# Patient Record
Sex: MALE | Race: WHITE | NOT HISPANIC OR LATINO | Employment: STUDENT | URBAN - METROPOLITAN AREA
[De-identification: names, ages, dates, MRNs, and addresses within clinical notes are randomized per-mention and may not be internally consistent; named-entity substitution may affect disease eponyms.]

---

## 2017-01-03 ENCOUNTER — CLINICAL SUPPORT (OUTPATIENT)
Dept: REHABILITATION | Facility: HOSPITAL | Age: 27
End: 2017-01-03
Attending: ORTHOPAEDIC SURGERY
Payer: COMMERCIAL

## 2017-01-03 DIAGNOSIS — M25.561 ACUTE PAIN OF RIGHT KNEE: ICD-10-CM

## 2017-01-03 PROCEDURE — 97110 THERAPEUTIC EXERCISES: CPT

## 2017-01-03 NOTE — PROGRESS NOTES
Physical Therapy Daily Note     Date: 01/03/2017  Name: [place] [facility] Number: [id number]  Diagnosis:   Encounter Diagnosis   Name Primary?    Acute pain of right knee      Physician: Margarito Machuca MD    Evaluation Date: 12/20/16  Date of Surgery: 12/19/16  Visit #: 3   Start Time:  1115  Stop Time:  1200  Total Treatment Time: 45    Precautions: s/p ACL with meniscus protocol        Subjective     Pt reports he has been doing his HEP while on vacation.    Pain: 1/10    Objective     Measurements taken:     Range of motion (PROM)     Knee:  Left Right   Extension  0 -2   Flexion 145 65       Patient received individual therapy to increase strength, endurance, ROM, flexibility and core stabilization with activities as follows:     [place] received therapeutic exercises to develop strength, endurance, ROM, flexibility and core stabilization for 40 minutes including:   Quad sets x30  Quad sets with ball squeeze x30-np  Ankle pumps x30  Heel prop x3 min  HS sets x30 bolster under knee-np  Seated AAROM R knee flex 10 sec x10  4 way hip x15  Calf stretch x1 min  Patellar mobs gentle x10 min    Treatment ended with ice x10 min    Written Home Exercises Provided: provided at initial eval  Pt demo good understanding of the education provided. [place] demonstrated good return demonstration of activities.     Education provided:  [place] verbalized good understanding of education provided.   No spiritual or educational barriers to learning identified.    Assessment   Patient demonstrated improved quad set and R knee ROM this visit. He remains slightly limited with knee extension, but presents with inflammation which could be a limiting factor. He is progressing well with ACL with meniscus repair protocol and would continue to benefit from PT intervention to reach functional goals.    This is a 26 y.o. male referred to outpatient physical  therapy and presents with a medical diagnosis of s/p R ACL with medial and lateral meniscus repairs and demonstrates limitations as described in the problem list Pt prognosis is Good. Pt will continue to benefit from skilled outpatient physical therapy to address the deficits listed below in the problem list, provide pt/family education and to maximize pt's level of independence in the home and community environment.    Will the patient continue to benefit from skilled PT intervention? yes        Medical necessity is demonstrated by:   - Pain limits function of effected part for all activities  - Unable to participate in daily activities       Progress towards goals: good    New/Revised Goals:none this visit       Plan   Continue with established Plan of Care towards PT goals.      Therapist: Trixie Amos, PT, DPT

## 2017-01-04 ENCOUNTER — OFFICE VISIT (OUTPATIENT)
Dept: SPORTS MEDICINE | Facility: CLINIC | Age: 27
End: 2017-01-04
Payer: COMMERCIAL

## 2017-01-04 ENCOUNTER — HOSPITAL ENCOUNTER (OUTPATIENT)
Dept: RADIOLOGY | Facility: HOSPITAL | Age: 27
Discharge: HOME | End: 2017-01-04
Attending: ORTHOPAEDIC SURGERY
Payer: COMMERCIAL

## 2017-01-04 VITALS
WEIGHT: 190 LBS | HEART RATE: 120 BPM | BODY MASS INDEX: 24.38 KG/M2 | SYSTOLIC BLOOD PRESSURE: 162 MMHG | DIASTOLIC BLOOD PRESSURE: 95 MMHG | HEIGHT: 74 IN

## 2017-01-04 DIAGNOSIS — M25.561 RIGHT KNEE PAIN, UNSPECIFIED CHRONICITY: ICD-10-CM

## 2017-01-04 DIAGNOSIS — M25.561 RIGHT KNEE PAIN, UNSPECIFIED CHRONICITY: Primary | ICD-10-CM

## 2017-01-04 PROCEDURE — 99024 POSTOP FOLLOW-UP VISIT: CPT | Mod: S$GLB,,, | Performed by: PHYSICIAN ASSISTANT

## 2017-01-04 PROCEDURE — 73560 X-RAY EXAM OF KNEE 1 OR 2: CPT | Mod: 26,RT,, | Performed by: RADIOLOGY

## 2017-01-04 PROCEDURE — 99999 PR PBB SHADOW E&M-EST. PATIENT-LVL III: CPT | Mod: PBBFAC,,, | Performed by: PHYSICIAN ASSISTANT

## 2017-01-04 PROCEDURE — 73560 X-RAY EXAM OF KNEE 1 OR 2: CPT | Mod: TC,PO,RT

## 2017-01-04 NOTE — PROGRESS NOTES
"CC: Right knee ACL reconstruction with BTB autograft post op 2 weeks  Patient presents to clinic for 2 week post op evaluation of right knee. Pain today is 0/10. Denies nausea, vomiting, fever, chills. He is going to PT with Trixie Amos. He is ambulating toe touch to 25% weight bearing with T-scope brace on.     DATE OF PROCEDURE: 12/19/2016      PREOPERATIVE DIAGNOSES:   1. Right knee anterior cruciate ligament tear.   2. Right knee medial meniscus tear  3. Right knee lateral meniscus tear  4. Right knee medial femoral condyle cartilage injury     POSTOPERATIVE DIAGNOSES:   1. Right knee anterior cruciate ligament tear.   2. Right knee medial meniscus tear  3. Right knee lateral meniscus tear  4. Right knee medial femoral condyle cartilage injury     PROCEDURES:   1. Right knee anterior cruciate ligament reconstruction with ipsilateral bone-patellar tendon-bone autograft.   2. Right knee arthroscopic medial and lateral meniscus repairs  3. Right knee arthroscopic chondroplasty / debridement medial femoral condyle     SURGEON: Margarito Machuca M.D.      ASSISTANTS:   1. Han Lopez MD  2. Anahy Marshall    PE:    Visit Vitals    BP (!) 162/95    Pulse (!) 120    Ht 6' 2" (1.88 m)    Wt 86.2 kg (190 lb)    BMI 24.39 kg/m2        Right knee:    Anterior Incision clean/dry/intact  Dermabond Prineo in place over incision.  No sign of infection  Mild swelling  Compartments soft  Neurovascular status intact in extremity    ROM 0-90  Decreased quad strength  Minimal to no effusion    Assessment:  2 weeks s/p right ACL reconstruction with BTB autograft, recovering appropriately    Plan:  1.  Removed steri strips.  Wounds healing well    2.  Arthroscopic pictures reviewed and rehab plans discussed.    3.  Physical therapy for ACL with BTB protocol.  Emphasize extension and straight leg raises.    4.  Pain level acceptable for first post op visit.  Wean off pain medicine by next visit if still taking.    5. Return to " clinic in 4 weeks at 6 weeks post-op.

## 2017-01-04 NOTE — MR AVS SNAPSHOT
[facility]  [address]  [facility] [address]  Phone: [phone]                  [place]   2017 3:45 PM   Appointment    Description:  Male : 1990   Provider:  Nataliia Green PA-C   Department:  [facility]                To Do List           Future Appointments        Provider Department Dept Phone    2017 3:45 PM Nataliia Green PA-C [facility] [phone]    2017 11:00 AM Trixie Amos PT [facility] [phone]    2017 11:00 AM Margarito Machuca MD [facility] [phone]      Goals (5 Years of Data)     None      [facility]     [facility] Care Line -  Assistance  Registered nurses in the [facility] provide clinical advisement, health education, appointment booking, and other advisory services.  Call for this free service at [phone].             Medications           Message regarding Medications     Verify the changes and/or additions to your medication regime listed below are the same as discussed with your clinician today.  If any of these changes or additions are incorrect, please notify your healthcare provider.             Verify that the below list of medications is an accurate representation of the medications you are currently taking.  If none reported, the list may be blank. If incorrect, please contact your healthcare provider. Carry this list with you in case of emergency.           Current Medications     aspirin (ECOTRIN) 325 MG EC tablet Take 1 tablet (325 mg total) by mouth once daily. Starting the day after your surgery    oxycodone-acetaminophen (PERCOCET)  mg per tablet Take 1 tablet by mouth every 4 to 6 hours as needed for Pain.    promethazine (PHENERGAN) 25 MG tablet Take 1 tablet (25 mg total) by mouth every 6 (six) hours as needed for Nausea.    tramadol (ULTRAM) 50 mg tablet Take 1 tablet (50  mg total) by mouth every 4 to 6 hours as needed for Pain.           Clinical Reference Information           Allergies as of 1/4/2017     No Known Allergies      Immunizations Administered on Date of Encounter - 1/4/2017     None

## 2017-01-05 ENCOUNTER — CLINICAL SUPPORT (OUTPATIENT)
Dept: REHABILITATION | Facility: HOSPITAL | Age: 27
End: 2017-01-05
Attending: ORTHOPAEDIC SURGERY
Payer: COMMERCIAL

## 2017-01-05 DIAGNOSIS — M25.561 ACUTE PAIN OF RIGHT KNEE: ICD-10-CM

## 2017-01-05 PROCEDURE — 97140 MANUAL THERAPY 1/> REGIONS: CPT

## 2017-01-05 PROCEDURE — 97110 THERAPEUTIC EXERCISES: CPT

## 2017-01-05 NOTE — PROGRESS NOTES
Physical Therapy Daily Note     Date: 01/05/2017  Name: [place] Lazar [facility] Number: [id number]  Diagnosis:   Encounter Diagnosis   Name Primary?    Acute pain of right knee      Physician: Margarito Machuca MD    Evaluation Date: 12/20/16  Date of Surgery: 12/19/16  Visit #: 3   Start Time:  1115  Stop Time:  1205  Total Treatment Time: 50    Precautions: s/p ACL with meniscus protocol        Subjective     Pt reports he doing good today.  Pain: 1/10    Objective     Measurements taken:     Range of motion (PROM)     Knee:  Left Right   Extension  0 -2   Flexion 145 75       Patient received individual therapy to increase strength, endurance, ROM, flexibility and core stabilization with activities as follows:     [place] received therapeutic exercises to develop strength, endurance, ROM, flexibility and core stabilization for 40 minutes including:   Quad sets x30 with 1/2 foam under knee  Quad sets with ball squeeze x30  Heel prop x3 min (big bolster)  HS sets x30 bolster under knee  Seated AAROM R knee flex 10 sec x10  4 way hip x15 1#  Calf stretch x1 min  HSS x1 min   Patellar mobs gentle x10 min    Treatment ended with ice x10 min    Written Home Exercises Provided: provided at initial eval  Pt demo good understanding of the education provided. [place] demonstrated good return demonstration of activities.     Education provided:  [place] verbalized good understanding of education provided.   No spiritual or educational barriers to learning identified.    Assessment   Patient tolerated resistance with leg raises well and continued to show good quad control. He is progressing well with ACL with meniscus repair protocol and would continue to benefit from PT intervention to reach functional goals.    This is a 26 y.o. male referred to outpatient physical therapy and presents with a medical diagnosis of s/p R ACL with medial and lateral  meniscus repairs and demonstrates limitations as described in the problem list Pt prognosis is Good. Pt will continue to benefit from skilled outpatient physical therapy to address the deficits listed below in the problem list, provide pt/family education and to maximize pt's level of independence in the home and community environment.    Will the patient continue to benefit from skilled PT intervention? yes        Medical necessity is demonstrated by:   - Pain limits function of effected part for all activities  - Unable to participate in daily activities       Progress towards goals: good    New/Revised Goals:none this visit       Plan   Continue with established Plan of Care towards PT goals.      Therapist: Trixie Amos, PT, DPT

## 2017-01-09 ENCOUNTER — CLINICAL SUPPORT (OUTPATIENT)
Dept: REHABILITATION | Facility: HOSPITAL | Age: 27
End: 2017-01-09
Attending: ORTHOPAEDIC SURGERY
Payer: COMMERCIAL

## 2017-01-09 ENCOUNTER — PATIENT MESSAGE (OUTPATIENT)
Dept: SPORTS MEDICINE | Facility: CLINIC | Age: 27
End: 2017-01-09

## 2017-01-09 DIAGNOSIS — M25.561 ACUTE PAIN OF RIGHT KNEE: ICD-10-CM

## 2017-01-09 DIAGNOSIS — S83.511A COMPLETE TEAR OF RIGHT ACL, INITIAL ENCOUNTER: Primary | ICD-10-CM

## 2017-01-09 PROCEDURE — 97140 MANUAL THERAPY 1/> REGIONS: CPT

## 2017-01-09 PROCEDURE — 97110 THERAPEUTIC EXERCISES: CPT

## 2017-01-10 NOTE — PROGRESS NOTES
Physical Therapy Daily Note     Diagnosis:   Encounter Diagnoses   Name Primary?    Acute pain of right knee     Complete tear of right ACL, initial encounter Yes     Physician: Margarito Machuca MD    Evaluation Date: 12/20/16  Date of Surgery: 12/19/16  Visit #: 5  Total Treatment Time: 50  Precautions: s/p ACL with meniscus protocol    Subjective     Pt reports doing well without pain today. Pt states having mild pain on lateral aspect of knee with flexion     Objective     Measurements taken:     Range of motion (PROM)     Knee:  Left Right   Extension  0 -2   Flexion 145 90       Patient received individual therapy to increase strength, endurance, ROM, flexibility and core stabilization with activities as follows:     [place] received therapeutic exercises to develop strength, endurance, ROM, flexibility and core stabilization for 40 minutes including:   Heel slides 30 reps - less than 90 degrees AAROM   Quad sets x30 with 1/2 foam under knee  Quad sets with ball squeeze x30  Heel prop x3 min 1/2 foam 2# - 2 minutes   HS sets x30 bolster under knee  Seated AAROM R knee flex 10 sec x10  3 way hip 2 x 10 reps   Calf stretch x1 min  HSS x1 min   Patellar mobs gentle x10 min    Treatment ended with ice x10 min    Written Home Exercises Provided: provided at initial eval  Pt demo good understanding of the education provided. [place] demonstrated good return demonstration of activities.     Education provided:  [place] verbalized good understanding of education provided.   No spiritual or educational barriers to learning identified.    Assessment   Patient with good tolerance to PT today; no increase of pain during session but quad muscle fatigue at the end of session. He is progressing well with ACL with meniscus repair protocol and would continue to benefit from PT intervention to reach functional goals.    This is a 26 y.o. male referred to  outpatient physical therapy and presents with a medical diagnosis of s/p R ACL with medial and lateral meniscus repairs and demonstrates limitations as described in the problem list Pt prognosis is Good. Pt will continue to benefit from skilled outpatient physical therapy to address the deficits listed below in the problem list, provide pt/family education and to maximize pt's level of independence in the home and community environment.    Will the patient continue to benefit from skilled PT intervention? yes        Medical necessity is demonstrated by:   - Pain limits function of effected part for all activities  - Unable to participate in daily activities       Progress towards goals: good    New/Revised Goals:none this visit       Plan   Continue with established Plan of Care towards PT goals. PT/PTA face-to-face:discussed Pt's POC and progress towards established PT goals.  Susanne Narayan, PTA

## 2017-01-11 ENCOUNTER — CLINICAL SUPPORT (OUTPATIENT)
Dept: REHABILITATION | Facility: HOSPITAL | Age: 27
End: 2017-01-11
Attending: ORTHOPAEDIC SURGERY
Payer: COMMERCIAL

## 2017-01-11 DIAGNOSIS — M25.561 ACUTE PAIN OF RIGHT KNEE: ICD-10-CM

## 2017-01-11 PROCEDURE — 97032 APPL MODALITY 1+ESTIM EA 15: CPT

## 2017-01-11 PROCEDURE — 97140 MANUAL THERAPY 1/> REGIONS: CPT

## 2017-01-11 PROCEDURE — 97110 THERAPEUTIC EXERCISES: CPT

## 2017-01-11 NOTE — PROGRESS NOTES
Physical Therapy Daily Note     Diagnosis:   Encounter Diagnosis   Name Primary?    Acute pain of right knee      Physician: Margarito Machuca MD    Evaluation Date: 12/20/16  Date of Surgery: 12/19/16  Visit #: 6  Total Treatment Time: 65  Precautions: s/p ACL with meniscus protocol    Subjective     Pt reports doing well without pain today.    Objective     Measurements taken:     Range of motion (PROM)     Knee:  Left Right   Extension  0 -2   Flexion 145 90       Patient received individual therapy to increase strength, endurance, ROM, flexibility and core stabilization with activities as follows:     [place] received therapeutic exercises to develop strength, endurance, ROM, flexibility and core stabilization for 60 minutes including:   Heel slides 30 reps - less than 90 degrees AAROM -np  Quad sets with russian e-stim (10 sec on/10 sec off) and strap x10 min  Quad sets with ball squeeze x30-np  Heel prop x3 min 1/2 foam 2# - 2 minutes -np  Prone hang 2# x3 min  HS sets x30 bolster under knee-np  Seated AAROM R knee ext  x10  4 way hip 1# x20  Heel raises x20 (25% WB on R)  Mini squats x20 (25% WB on R)  Calf stretch x1 min  HSS x1 min -np  Patellar mobs with STM x10 min    Treatment ended with ice x10 min    Written Home Exercises Provided: provided at initial eval  Pt demo good understanding of the education provided. [place] demonstrated good return demonstration of activities.     Education provided:  [place] verbalized good understanding of education provided.   No spiritual or educational barriers to learning identified.    Assessment   Patient demonstrated improved quad strength and knee extension after Lao e-stim. He is progressing well with ACL with meniscus repair protocol and would continue to benefit from PT intervention to reach functional goals.    This is a 26 y.o. male referred to outpatient physical therapy and  presents with a medical diagnosis of s/p R ACL with medial and lateral meniscus repairs and demonstrates limitations as described in the problem list Pt prognosis is Good. Pt will continue to benefit from skilled outpatient physical therapy to address the deficits listed below in the problem list, provide pt/family education and to maximize pt's level of independence in the home and community environment.    Will the patient continue to benefit from skilled PT intervention? yes        Medical necessity is demonstrated by:   - Pain limits function of effected part for all activities  - Unable to participate in daily activities       Progress towards goals: good    New/Revised Goals:none this visit       Plan   Continue with established Plan of Care towards PT goals.     Trixie Amos, PT, DPT

## 2017-01-17 ENCOUNTER — CLINICAL SUPPORT (OUTPATIENT)
Dept: REHABILITATION | Facility: HOSPITAL | Age: 27
End: 2017-01-17
Attending: ORTHOPAEDIC SURGERY
Payer: COMMERCIAL

## 2017-01-17 DIAGNOSIS — M25.561 ACUTE PAIN OF RIGHT KNEE: Primary | ICD-10-CM

## 2017-01-17 PROCEDURE — 97110 THERAPEUTIC EXERCISES: CPT

## 2017-01-17 PROCEDURE — 97140 MANUAL THERAPY 1/> REGIONS: CPT

## 2017-01-17 PROCEDURE — 97014 ELECTRIC STIMULATION THERAPY: CPT

## 2017-01-17 NOTE — PROGRESS NOTES
Physical Therapy Daily Note     Diagnosis:   Encounter Diagnosis   Name Primary?    Acute pain of right knee Yes     Physician: Margarito Machuca MD    Evaluation Date: 12/20/16  Date of Surgery: 12/19/16  Visit #: 7  Total Treatment Time: 65  Precautions: s/p ACL with meniscus protocol    Subjective     Pt reports doing well and again is without pain today.    Objective     Measurements taken:     Range of motion (PROM)     Knee:  Left Right   Extension  0 -2   Flexion 145 90       Pt was instructed in and performed ther ex  to increase strength, endurance, ROM, flexibility and core stabilization with activities as follows:     [place] was instructed in and performed therapeutic exercises to develop strength, endurance, ROM, flexibility and core stabilization for 40 minutes including:   Stat bike  Heel slides 30 reps - less than 120 degrees AAROM -  Quad sets with russian e-stim (10 sec on/10 sec off) and strap x10 min  Quad sets with ball squeeze x30-np  Heel prop x3 min 1/2 foam 2# - 2 minutes -np  Prone hang 3# x4 min  HS sets x30 bolster under knee-np  Seated AAROM R knee ext  x10 NP  4 way hip 1.5# x30  Heel raises x20 (25% WB on R) NP  Mini squats x20 (25% WB on R) NP  Calf stretch x2 min  HSS x 2min -  Patellar mobs with STM x10 min    Treatment ended with ice x10 min    Written Home Exercises Provided: provided at initial eval  Pt demo good understanding of the education provided. [place] demonstrated good return demonstration of activities.     Education provided:  [place] verbalized good understanding of education provided.   No spiritual or educational barriers to learning identified.    Assessment   Patient demonstrated improved quad strength and knee extension after [place]ian e-stim. He is progressing well with ACL with meniscus repair protocol and would continue to benefit from PT intervention to reach functional goals.    This is  a 26 y.o. male referred to outpatient physical therapy and presents with a medical diagnosis of s/p R ACL with medial and lateral meniscus repairs and demonstrates limitations as described in the problem list Pt prognosis is Good. Pt will continue to benefit from skilled outpatient physical therapy to address the deficits listed below in the problem list, provide pt/family education and to maximize pt's level of independence in the home and community environment.    Will the patient continue to benefit from skilled PT intervention? yes        Medical necessity is demonstrated by:   - Pain limits function of effected part for all activities  - Unable to participate in daily activities       Progress towards goals: good    New/Revised Goals:none this visit       Plan   Continue with established Plan of Care towards PT goals.     Sohail Kilgore PTA,

## 2017-01-19 ENCOUNTER — CLINICAL SUPPORT (OUTPATIENT)
Dept: REHABILITATION | Facility: HOSPITAL | Age: 27
End: 2017-01-19
Attending: ORTHOPAEDIC SURGERY
Payer: COMMERCIAL

## 2017-01-19 DIAGNOSIS — M25.561 ACUTE PAIN OF RIGHT KNEE: ICD-10-CM

## 2017-01-19 PROCEDURE — 97032 APPL MODALITY 1+ESTIM EA 15: CPT

## 2017-01-19 PROCEDURE — 97110 THERAPEUTIC EXERCISES: CPT

## 2017-01-24 ENCOUNTER — CLINICAL SUPPORT (OUTPATIENT)
Dept: REHABILITATION | Facility: HOSPITAL | Age: 27
End: 2017-01-24
Attending: ORTHOPAEDIC SURGERY
Payer: COMMERCIAL

## 2017-01-24 DIAGNOSIS — M25.561 ACUTE PAIN OF RIGHT KNEE: Primary | ICD-10-CM

## 2017-01-24 PROCEDURE — 97110 THERAPEUTIC EXERCISES: CPT

## 2017-01-24 PROCEDURE — 97140 MANUAL THERAPY 1/> REGIONS: CPT

## 2017-01-24 NOTE — PROGRESS NOTES
Physical Therapy Daily Note     Diagnosis:   Encounter Diagnosis   Name Primary?    Acute pain of right knee Yes     Physician: Margarito Machuca MD    Evaluation Date: 12/20/16  Date of Surgery: 12/19/16  Visit #: 9  Total Treatment Time: 65  Precautions: s/p ACL with meniscus protocol    Subjective     Pt reports doing well and again is without pain today.    Objective     Measurements taken:   Decreased edema, healing incision   Range of motion (PROM)     Knee:  Left Right   Extension  0 -2   Flexion 145 110       Pt was instructed in and performed ther ex  to increase strength, endurance, ROM, flexibility and core stabilization with activities as follows:     [place] was instructed in and performed therapeutic exercises to develop strength, endurance, ROM, flexibility and core stabilization for 55 minutes including:   Stat bike x 10 min  Heel slides 30 reps - less than 120 degrees AAROM -  Quad sets with russian e-stim (10 sec on/10 sec off) and strap x10 min  Heel prop x3 min 1/2 foam 2# - 2 minutes -np  Prone hang 3# x4 min-  HS sets x30 bolster under knee-np  Seated AAROM R knee ext  x10 NP  4 way hip 1.5# x30  Heel raises x20 (50% WB on R)   Mini squats x20 (50% WB on R)   Calf stretch x2 min  HSS x 2min -np  Patellar mobs with STM x10 min    Treatment ended with ice x10 min    Written Home Exercises Provided: provided at initial eval  Pt demo good understanding of the education provided. [place] demonstrated good return demonstration of activities.     Education provided:  [place] verbalized good understanding of education provided.   No spiritual or educational barriers to learning identified.    Assessment   Patient continues to progress ROM appropriately according to ACL with meniscus protocol. He is progressing well with ACL with meniscus repair protocol and would continue to benefit from PT intervention to reach functional  goals.    This is a 26 y.o. male referred to outpatient physical therapy and presents with a medical diagnosis of s/p R ACL with medial and lateral meniscus repairs and demonstrates limitations as described in the problem list Pt prognosis is Good. Pt will continue to benefit from skilled outpatient physical therapy to address the deficits listed below in the problem list, provide pt/family education and to maximize pt's level of independence in the home and community environment.    Will the patient continue to benefit from skilled PT intervention? yes        Medical necessity is demonstrated by:   - Pain limits function of effected part for all activities  - Unable to participate in daily activities       Progress towards goals: good    New/Revised Goals:none this visit       Plan   Continue with established Plan of Care towards PT goals.     Sohail Kilgore PTA,

## 2017-01-26 ENCOUNTER — CLINICAL SUPPORT (OUTPATIENT)
Dept: REHABILITATION | Facility: HOSPITAL | Age: 27
End: 2017-01-26
Attending: ORTHOPAEDIC SURGERY
Payer: COMMERCIAL

## 2017-01-26 DIAGNOSIS — M25.561 ACUTE PAIN OF RIGHT KNEE: ICD-10-CM

## 2017-01-26 PROCEDURE — 97014 ELECTRIC STIMULATION THERAPY: CPT

## 2017-01-26 PROCEDURE — 97110 THERAPEUTIC EXERCISES: CPT

## 2017-01-26 NOTE — PROGRESS NOTES
Physical Therapy Daily Note     Diagnosis:   Encounter Diagnosis   Name Primary?    Acute pain of right knee      Physician: Margarito Machuca MD    Evaluation Date: 12/20/16  Date of Surgery: 12/19/16  Visit #: 9  Total Treatment Time: 65  Precautions: s/p ACL with meniscus protocol    Subjective     Pt reports doing well and again is without pain today.    Objective     Measurements taken:   Decreased edema, healing incision   Range of motion (PROM)     Knee:  Left Right   Extension  0 -2   Flexion 145 110       Pt was instructed in and performed ther ex  to increase strength, endurance, ROM, flexibility and core stabilization with activities as follows:     [place] was instructed in and performed therapeutic exercises to develop strength, endurance, ROM, flexibility and core stabilization for 60 minutes including:   Stat bike x 10 min  Heel slides 30 reps - less than 120 degrees AAROM -np  Quad sets with russian e-stim (10 sec on/10 sec off) and strap x10 min  Heel prop x3 min 1/2 foam 2# - 2 minutes -np  Prone hang 3# x4 min-np  HS sets x30 bolster under knee-np  Seated AAROM R knee ext  x10 NP  4 way hip 1.5# x30-np  Heel raises x20 (50% WB on R)   Mini squats x20 (50% WB on R)   Clamshells x30 btb   TKE 10# x30  Calf stretch x2 min  HSS x 2min   Patellar mobs with STM x5 min  Supine hip flexor stretch x2 min    Treatment ended with ice x10 min    Written Home Exercises Provided: provided at initial eval  Pt demo good understanding of the education provided. [place] demonstrated good return demonstration of activities.     Education provided:  [place] verbalized good understanding of education provided.   No spiritual or educational barriers to learning identified.    Assessment   Patient tolerated new exercises well and demonstrated good technique with tactile cues. He is progressing well with ACL with meniscus repair protocol and would  continue to benefit from PT intervention to reach functional goals.    This is a 26 y.o. male referred to outpatient physical therapy and presents with a medical diagnosis of s/p R ACL with medial and lateral meniscus repairs and demonstrates limitations as described in the problem list Pt prognosis is Good. Pt will continue to benefit from skilled outpatient physical therapy to address the deficits listed below in the problem list, provide pt/family education and to maximize pt's level of independence in the home and community environment.    Will the patient continue to benefit from skilled PT intervention? yes        Medical necessity is demonstrated by:   - Pain limits function of effected part for all activities  - Unable to participate in daily activities       Progress towards goals: good    New/Revised Goals:none this visit       Plan   Continue with established Plan of Care towards PT goals.     Trixie Amos, PT, DPT

## 2017-01-31 ENCOUNTER — CLINICAL SUPPORT (OUTPATIENT)
Dept: REHABILITATION | Facility: HOSPITAL | Age: 27
End: 2017-01-31
Attending: ORTHOPAEDIC SURGERY
Payer: COMMERCIAL

## 2017-01-31 ENCOUNTER — OFFICE VISIT (OUTPATIENT)
Dept: SPORTS MEDICINE | Facility: CLINIC | Age: 27
End: 2017-01-31
Payer: COMMERCIAL

## 2017-01-31 VITALS
BODY MASS INDEX: 24.38 KG/M2 | HEART RATE: 88 BPM | DIASTOLIC BLOOD PRESSURE: 92 MMHG | WEIGHT: 190 LBS | HEIGHT: 74 IN | SYSTOLIC BLOOD PRESSURE: 145 MMHG

## 2017-01-31 DIAGNOSIS — Z98.890 S/P ACL RECONSTRUCTION: Primary | ICD-10-CM

## 2017-01-31 DIAGNOSIS — M25.561 ACUTE PAIN OF RIGHT KNEE: Primary | ICD-10-CM

## 2017-01-31 DIAGNOSIS — Z98.890 POST-OPERATIVE STATE: ICD-10-CM

## 2017-01-31 PROCEDURE — 97110 THERAPEUTIC EXERCISES: CPT

## 2017-01-31 PROCEDURE — 99999 PR PBB SHADOW E&M-EST. PATIENT-LVL III: CPT | Mod: PBBFAC,,, | Performed by: ORTHOPAEDIC SURGERY

## 2017-01-31 PROCEDURE — 97140 MANUAL THERAPY 1/> REGIONS: CPT

## 2017-01-31 PROCEDURE — 99024 POSTOP FOLLOW-UP VISIT: CPT | Mod: S$GLB,,, | Performed by: ORTHOPAEDIC SURGERY

## 2017-01-31 NOTE — PROGRESS NOTES
"CC: Right knee scope post op 6 weeks.  Physical therapy 2 x week at [facility] location.  DATE OF PROCEDURE: 12/19/2016      PREOPERATIVE DIAGNOSES:   1. Right knee anterior cruciate ligament tear.   2. Right knee medial meniscus tear  3. Right knee lateral meniscus tear  4. Right knee medial femoral condyle cartilage injury     POSTOPERATIVE DIAGNOSES:   1. Right knee anterior cruciate ligament tear.   2. Right knee medial meniscus tear  3. Right knee lateral meniscus tear  4. Right knee medial femoral condyle cartilage injury     PROCEDURES:   1. Right knee anterior cruciate ligament reconstruction with ipsilateral bone-patellar tendon-bone autograft.   2. Right knee arthroscopic medial and lateral meniscus repairs  3. Right knee arthroscopic chondroplasty / debridement medial femoral condyle     SURGEON: Margarito Machuca M.D.    PE:    Visit Vitals    BP (!) 145/92    Pulse 88    Ht 6' 2" (1.88 m)    Wt 86.2 kg (190 lb)    BMI 24.39 kg/m2        Right knee:    Incision clean/dry/intact  No sign of infection  Mild swelling  Compartments soft  Neurovascular status intact in extremity    FROM  Good quad strength  No effusion  No tenderness over medial or lateral joint lines    ROM: 0-135    Assessment:  6 weeks s/p right knee ACL     Plan:  1. Continue physical therapy  2 Transition out of brace and off crutches  3. Follow up 6 weeks      "

## 2017-01-31 NOTE — PROGRESS NOTES
Physical Therapy Daily Note     Diagnosis:   Encounter Diagnosis   Name Primary?    Acute pain of right knee Yes     Physician: Margarito Machuca MD    Evaluation Date: 12/20/16  Date of Surgery: 12/19/16  Visit #: 9  Total Treatment Time: 65  Precautions: s/p ACL with meniscus protocol    Subjective     Pt reports doing well and again is without pain today.    Objective     Measurements taken: PROM flex 125 degrees prone, +2 ext in supine.  Decreased edema, healing incision   Range of motion (PROM)     Knee:  Left Right   Extension  0 -2   Flexion 145 110       Pt was instructed in and performed ther ex  to increase strength, endurance, ROM, flexibility and core stabilization with activities as follows:     [place] was instructed in and performed therapeutic exercises to develop strength, endurance, ROM, flexibility and core stabilization for 60 minutes including:   Stat bike x 10 min  Quad sets with russian e-stim (10 sec on/10 sec off) and strap x10 min  Prone hang 4# x4 min-  prone flex stretch with strap 2 min  4 way hip 2.5# x30-  Leg Press 60# 30x  Calf Press 60# 30x  Wall Squat 3 x fatigue   Clamshells x30 btb   TKE 10# x30  Calf stretch x2 min  standing flex stretch on stairs 2 min  HSS x 2min   Patellar jt mobs PROM x 10 min  Supine hip flexor stretch x2 min    Treatment ended with ice x10 min NP    Written Home Exercises Provided: provided at initial eval  Pt demo good understanding of the education provided. [place] demonstrated good return demonstration of activities.     Education provided:  [place] verbalized good understanding of education provided.   No spiritual or educational barriers to learning identified.    Assessment   Patient tolerated new exercises well and demonstrated good technique with tactile cues. He is progressing well with ACL with meniscus repair protocol and would continue to benefit from PT intervention to  reach functional goals.Pt sitll demonstrates quad weakness, improving ROM.  This is a 26 y.o. male referred to outpatient physical therapy and presents with a medical diagnosis of s/p R ACL with medial and lateral meniscus repairs and demonstrates limitations as described in the problem list Pt prognosis is Good. Pt will continue to benefit from skilled outpatient physical therapy to address the deficits listed below in the problem list, provide pt/family education and to maximize pt's level of independence in the home and community environment.    Will the patient continue to benefit from skilled PT intervention? yes        Medical necessity is demonstrated by:   - Pain limits function of effected part for all activities  - Unable to participate in daily activities       Progress towards goals: good    New/Revised Goals:none this visit       Plan   Continue with established Plan of Care towards PT goals.     Sohail Kilgore PTA,

## 2017-01-31 NOTE — MR AVS SNAPSHOT
[facility]  [address]  [facility] [address]  Phone: [phone]                  [place]   2017 11:00 AM   Office Visit    Description:  Male : 1990   Provider:  Margarito Machuca MD   Department:  [facility]           Reason for Visit     Right Knee - Post-op Evaluation                To Do List           Future Appointments        Provider Department Dept Phone    2017 11:00 AM Trixie Amos PT [facility] [phone]      Goals (5 Years of Data)     None      [facility]     [facility] -  Assistance  Registered nurses in the [facility] provide clinical advisement, health education, appointment booking, and other advisory services.  Call for this free service at [phone].             Medications           Message regarding Medications     Verify the changes and/or additions to your medication regime listed below are the same as discussed with your clinician today.  If any of these changes or additions are incorrect, please notify your healthcare provider.             Verify that the below list of medications is an accurate representation of the medications you are currently taking.  If none reported, the list may be blank. If incorrect, please contact your healthcare provider. Carry this list with you in case of emergency.           Current Medications     oxycodone-acetaminophen (PERCOCET)  mg per tablet Take 1 tablet by mouth every 4 to 6 hours as needed for Pain.    tramadol (ULTRAM) 50 mg tablet Take 1 tablet (50 mg total) by mouth every 4 to 6 hours as needed for Pain.    aspirin (ECOTRIN) 325 MG EC tablet Take 1 tablet (325 mg total) by mouth once daily. Starting the day after your surgery           Clinical Reference Information           Vital Signs - Last Recorded  Most recent update: 2017 11:01 AM by aCrolyn Verduzco MA    BP Pulse Ht Wt  "BMI    (!) 145/92 88 6' 2" (1.88 m) 86.2 kg (190 lb) 24.39 kg/m2      Blood Pressure          Most Recent Value    BP  (!)  145/92      Allergies as of 1/31/2017     No Known Allergies      Immunizations Administered on Date of Encounter - 1/31/2017     None      "

## 2017-02-02 ENCOUNTER — CLINICAL SUPPORT (OUTPATIENT)
Dept: REHABILITATION | Facility: HOSPITAL | Age: 27
End: 2017-02-02
Attending: ORTHOPAEDIC SURGERY
Payer: COMMERCIAL

## 2017-02-02 DIAGNOSIS — M25.561 ACUTE PAIN OF RIGHT KNEE: ICD-10-CM

## 2017-02-02 PROCEDURE — 97032 APPL MODALITY 1+ESTIM EA 15: CPT

## 2017-02-02 PROCEDURE — 97110 THERAPEUTIC EXERCISES: CPT

## 2017-02-02 NOTE — PROGRESS NOTES
"                                                  Physical Therapy Daily Note     Diagnosis:   Encounter Diagnosis   Name Primary?    Acute pain of right knee      Physician: Margarito Machuca MD    Evaluation Date: 12/20/16  Date of Surgery: 12/19/16  Visit #: 12  Total Treatment Time: 65  Precautions: s/p ACL with meniscus protocol    Subjective     Pt reports doing well and again is without pain today.    Objective     Measurements taken: PROM flex 125 degrees prone, +2 ext in supine.  Decreased edema, healing incision     Range of motion (PROM)     Knee:  Left Right   Extension  0 -2   Flexion 145 125       Pt was instructed in and performed ther ex  to increase strength, endurance, ROM, flexibility and core stabilization with activities as follows:     [place] was instructed in and performed therapeutic exercises to develop strength, endurance, ROM, flexibility and core stabilization for 60 minutes including:   Stat bike x 10 min R5  Quad sets with russian e-stim (10 sec on/10 sec off) and strap x10 min  Prone hang 4# x4 min-np  prone flex stretch with strap 2 min-np  Leg Press 80# 30x  Calf Press 80# 30x  Wall Squat 3 x fatigue   Clamshells btb 2x 30 sec hold  Heel taps 4" box 2x10  TKE 10# x30-np  Calf stretch x2 min-np  Lateral walk gtb x1 lap  standing flex stretch on stairs 2 min-np  HSS x 2min -np  Patellar jt mobs PROM x 10 min-np  Supine hip flexor stretch x2 min-np    Treatment ended with ice x10 min     Written Home Exercises Provided: provided at initial eval  Pt demo good understanding of the education provided. [place] demonstrated good return demonstration of activities.     Education provided:  [place] verbalized good understanding of education provided.   No spiritual or educational barriers to learning identified.    Assessment   Patient tolerated new exercises well and demonstrated appropriate muscle shaking indicating weakness. He is progressing well with ACL with " meniscus repair protocol and would continue to benefit from PT intervention to reach functional goals.Pt sitll demonstrates quad weakness, improving ROM.  This is a 26 y.o. male referred to outpatient physical therapy and presents with a medical diagnosis of s/p R ACL with medial and lateral meniscus repairs and demonstrates limitations as described in the problem list Pt prognosis is Good. Pt will continue to benefit from skilled outpatient physical therapy to address the deficits listed below in the problem list, provide pt/family education and to maximize pt's level of independence in the home and community environment.    Will the patient continue to benefit from skilled PT intervention? yes        Medical necessity is demonstrated by:   - Pain limits function of effected part for all activities  - Unable to participate in daily activities       Progress towards goals: good    New/Revised Goals:none this visit       Plan   Continue with established Plan of Care towards PT goals.     Trixie Amos, PT, DPT

## 2017-02-06 ENCOUNTER — CLINICAL SUPPORT (OUTPATIENT)
Dept: REHABILITATION | Facility: HOSPITAL | Age: 27
End: 2017-02-06
Attending: ORTHOPAEDIC SURGERY
Payer: COMMERCIAL

## 2017-02-06 DIAGNOSIS — M25.561 ACUTE PAIN OF RIGHT KNEE: ICD-10-CM

## 2017-02-06 PROCEDURE — 97110 THERAPEUTIC EXERCISES: CPT

## 2017-02-08 ENCOUNTER — CLINICAL SUPPORT (OUTPATIENT)
Dept: REHABILITATION | Facility: HOSPITAL | Age: 27
End: 2017-02-08
Attending: ORTHOPAEDIC SURGERY
Payer: COMMERCIAL

## 2017-02-08 DIAGNOSIS — M25.561 ACUTE PAIN OF RIGHT KNEE: ICD-10-CM

## 2017-02-08 PROCEDURE — 97110 THERAPEUTIC EXERCISES: CPT

## 2017-02-08 NOTE — PROGRESS NOTES
"                                                  Physical Therapy Daily Note     Diagnosis:   Encounter Diagnosis   Name Primary?    Acute pain of right knee      Physician: Margarito Machuca MD    Evaluation Date: 12/20/16  Date of Surgery: 12/19/16  Visit #: 13  Total Treatment Time: 65  Precautions: s/p ACL with meniscus protocol    Subjective     Pt reports doing fine without pain. Pt ambulating without brace and without B axillary crutches.     Objective     Measurements taken: PROM flex 125 degrees prone, +2 ext in supine.  Decreased edema, healing incision     Range of motion (PROM)     Knee:  Left Right   Extension  0 -2   Flexion 145 125       Pt was instructed in and performed ther ex  to increase strength, endurance, ROM, flexibility and core stabilization with activities as follows:     [place] was instructed in and performed therapeutic exercises to develop strength, endurance, ROM, flexibility and core stabilization for 60 minutes including:   Stat bike x 10 min R5  Quad sets with russian e-stim (10 sec on/10 sec off) and strap x10 min  Prone hang 4# x4 min-np  prone flex stretch with strap 2 min-np  Leg Press 80# 30x  Calf Press 80# 30x  Wall Squat 3 x fatigue   Clamshells btb 2x 30 sec hold  Heel taps 4" box 2x10  TKE 10# x30-np  Calf stretch x2 min-np  Lateral walk gtb x1 lap  standing flex stretch on stairs 2 min-np  HSS x 2min -np  Patellar jt mobs PROM x 10 min-np  Supine hip flexor stretch x2 min-np    Treatment ended with ice x10 min     Written Home Exercises Provided: provided at initial eval  Pt demo good understanding of the education provided. [place] demonstrated good return demonstration of activities.     Education provided:  [place] verbalized good understanding of education provided.   No spiritual or educational barriers to learning identified.    Assessment   Patient tolerated new exercises well and demonstrated appropriate muscle shaking indicating weakness. " He is progressing well with ACL with meniscus repair protocol and would continue to benefit from PT intervention to reach functional goals.Pt sitll demonstrates quad weakness, improving ROM.  This is a 26 y.o. male referred to outpatient physical therapy and presents with a medical diagnosis of s/p R ACL with medial and lateral meniscus repairs and demonstrates limitations as described in the problem list Pt prognosis is Good. Pt will continue to benefit from skilled outpatient physical therapy to address the deficits listed below in the problem list, provide pt/family education and to maximize pt's level of independence in the home and community environment.    Will the patient continue to benefit from skilled PT intervention? yes        Medical necessity is demonstrated by:   - Pain limits function of effected part for all activities  - Unable to participate in daily activities     Progress towards goals: good     Plan   Continue with established Plan of Care towards PT goals.

## 2017-02-08 NOTE — PROGRESS NOTES
"                                                  Physical Therapy Daily Note     Diagnosis:   Encounter Diagnosis   Name Primary?    Acute pain of right knee      Physician: Margarito Machuca MD    Evaluation Date: 12/20/16  Date of Surgery: 12/19/16  Visit #: 14  Total Treatment Time: 65  Precautions: s/p ACL with meniscus protocol    Subjective     Pt reports no pain, but states he starts feeling it slightly (1/10) with walking.    Objective     Measurements taken: PROM flex 125 degrees prone, +2 ext in supine.  Decreased edema, healing incision     Range of motion (PROM)     Knee:  Left Right   Extension  0 -2   Flexion 145 125       Pt was instructed in and performed ther ex  to increase strength, endurance, ROM, flexibility and core stabilization with activities as follows:     [place] was instructed in and performed therapeutic exercises to develop strength, endurance, ROM, flexibility and core stabilization for 60 minutes including:   Stat bike x 10 min R5  Quad sets with russian e-stim (10 sec on/10 sec off) and strap x10 min  Prone hang 4# x4 min-np  prone flex stretch with strap 2 min-np  Leg Press 80# 30x  Calf Press 80# 30x  Ecc quad 60# 2x15  Bridge on ball x30 with TA  Wall Squat 3 x fatigue   Clamshells btb 2x fatigue  Heel taps 4" box 2x10  TKE 10# x30-np  Calf stretch x2 min  Lateral walk gtb x1 lap  standing flex stretch on stairs 2 min-np  HSS x 1min   GSS x2 min  Patellar jt mobs with STM for scar tissue x5 min  Supine hip flexor stretch x2 min    Treatment ended with ice x10 min     Written Home Exercises Provided: provided at initial eval  Pt demo good understanding of the education provided. [place] demonstrated good return demonstration of activities.     Education provided:  [place] verbalized good understanding of education provided.   No spiritual or educational barriers to learning identified.    Assessment   Patient is showing improved muscular endurance each " visit and continues to demonstrated increased duration with exercises. Patient is still showing quad weakness during heel taps.  He is progressing well with ACL with meniscus repair protocol and would continue to benefit from PT intervention to reach functional goals.Pt sitll demonstrates quad weakness, improving ROM.  This is a 26 y.o. male referred to outpatient physical therapy and presents with a medical diagnosis of s/p R ACL with medial and lateral meniscus repairs and demonstrates limitations as described in the problem list Pt prognosis is Good. Pt will continue to benefit from skilled outpatient physical therapy to address the deficits listed below in the problem list, provide pt/family education and to maximize pt's level of independence in the home and community environment.    Will the patient continue to benefit from skilled PT intervention? yes        Medical necessity is demonstrated by:   - Pain limits function of effected part for all activities  - Unable to participate in daily activities     Progress towards goals: good     Plan   Continue with established Plan of Care towards PT goals.     Trixie Amos, PT, DPT

## 2017-02-13 ENCOUNTER — CLINICAL SUPPORT (OUTPATIENT)
Dept: REHABILITATION | Facility: HOSPITAL | Age: 27
End: 2017-02-13
Attending: ORTHOPAEDIC SURGERY
Payer: COMMERCIAL

## 2017-02-13 DIAGNOSIS — M25.561 ACUTE PAIN OF RIGHT KNEE: ICD-10-CM

## 2017-02-13 PROCEDURE — 97110 THERAPEUTIC EXERCISES: CPT

## 2017-02-15 ENCOUNTER — CLINICAL SUPPORT (OUTPATIENT)
Dept: REHABILITATION | Facility: HOSPITAL | Age: 27
End: 2017-02-15
Attending: ORTHOPAEDIC SURGERY
Payer: COMMERCIAL

## 2017-02-15 DIAGNOSIS — M25.561 ACUTE PAIN OF RIGHT KNEE: Primary | ICD-10-CM

## 2017-02-15 PROCEDURE — 97014 ELECTRIC STIMULATION THERAPY: CPT

## 2017-02-15 PROCEDURE — 97140 MANUAL THERAPY 1/> REGIONS: CPT

## 2017-02-15 PROCEDURE — 97110 THERAPEUTIC EXERCISES: CPT

## 2017-02-15 NOTE — PROGRESS NOTES
"                                                  Physical Therapy Daily Note     Diagnosis:   Encounter Diagnosis   Name Primary?    Acute pain of right knee Yes     Physician: Margarito Machuca MD  Time 0955  Time out 1100  Therex 65    Evaluation Date: 12/20/16  Date of Surgery: 12/19/16  Visit #: 15  Total Treatment Time:   Precautions: s/p  R ACL with meniscus protocol    Subjective     Pt reporting no pain when arrived for tx but reporting soreness last tx. Pain scale 0/10. Minimal compliance with HEP and compliance with RICE    Objective     Measurements taken: PROM flex 125 degrees prone, +2 ext in supine.  Decreased edema, healing incision     Range of motion (PROM)     Knee:  Left Right   Extension  0 -2   Flexion 145 125       Pt was instructed in and performed ther ex  to increase strength, endurance, ROM, flexibility and core stabilization with activities as follows:     [place] was instructed in and performed therapeutic exercises to develop strength, endurance, ROM, flexibility and core stabilization for 60 minutes including:   Stat bike x 10 min R5 for increased ROM   Quad sets with towel 30x/3"  Standing TKE with GTB 30x   Standing TKE ball against wall 30x/3"  Patellar MOBS/PROM supine and prone L knee x10'  Prone hang 4# x4 min-np  prone flex stretch with strap 2 min-np  Leg Press 100# 30x  Calf Press 100# 30x  Ecc quad 60# 2x15  Bridge on ball x30 with TA  Wall Squat 3 x fatigue NP  Clamshells btb B LE 30x ea  Heel taps 4" box 2x10  TKE 10# x30-np  Calf stretch x2 min  Lateral walk gtb x1 lap  standing flex stretch on stairs 2 min-np  HSS R LE 2x/1' ea   GSS on slant  2x/1' ea    Supine hip flexor stretch x2 min np    Treatment ended with ice and NMES  x10 min     Written Home Exercises Provided: provided at initial eval  Pt demo good understanding of the education provided. [place] demonstrated good return demonstration of activities.     Education provided:  [place] " verbalized good understanding of education provided.   No spiritual or educational barriers to learning identified.    Assessment   Patient tolerating tx well with increased wt several exercise w/o problem. R quad fatigue and weakness, but with improving ROM.   Pt  progressing well with ACL with meniscus repair protocol and would continue to benefit from PT intervention to reach functional goals.   This is a 26 y.o. male referred to outpatient physical therapy and presents with a medical diagnosis of s/p R ACL with medial and lateral meniscus repairs and demonstrates limitations as described in the problem list Pt prognosis is Good. Pt will continue to benefit from skilled outpatient physical therapy to address the deficits listed below in the problem list, provide pt/family education and to maximize pt's level of independence in the home and community environment.    Will the patient continue to benefit from skilled PT intervention? yes        Medical necessity is demonstrated by:   - Pain limits function of effected part for all activities  - Unable to participate in daily activities     Progress towards goals: good     Plan   Continue with established Plan of Care towards PT goals.     Trixie Amos, PT, DPT

## 2017-02-15 NOTE — PROGRESS NOTES
"                                                  Physical Therapy Daily Note     Diagnosis:   Encounter Diagnosis   Name Primary?    Acute pain of right knee      Physician: Margarito Machuca MD    Evaluation Date: 12/20/16  Date of Surgery: 12/19/16  Visit #: 15  Total Treatment Time: 65  Precautions: s/p ACL with meniscus protocol    Subjective     Pt reports no pain, feeling fine.     Objective     Pt was instructed in and performed ther ex  to increase strength, endurance, ROM, flexibility and core stabilization with activities as follows:     [place] was instructed in and performed therapeutic exercises to develop strength, endurance, ROM, flexibility and core stabilization for 60 minutes including:   Stat bike x 10 min R5  Standing:gastroc/HS stretch 2 x 1 minute   Long sitting:QS with HS stretch 2 minutes - hold 3 sec   SAQ 5, 5, 5  20 reps   SLR 2# 20 reps   6" step - modified lunge 2 x 10 reps   pilates box - modified stationary   SLS re bound on blue foam 2 x 20 reps   SLE heel raises 2 x 15 reps   SL hip abduction 2# 3 x 10 reps   Bridges with LEs crossed 2 x 15 reps   Treatment ended with ice x10 min     Written Home Exercises Provided: provided at initial eval  Pt demo good understanding of the education provided. [place] demonstrated good return demonstration of activities.     Education provided:  [place] verbalized good understanding of education provided.   No spiritual or educational barriers to learning identified.    Assessment   Patient with good tolerance to PT today, no increase of pain during session, only muscles (HS and quad) fatigue at the end of session. Patient is still showing quad weakness during heel taps.  He is progressing well with ACL with meniscus repair protocol and would continue to benefit from PT intervention to reach functional goals.Pt sitll demonstrates quad weakness, improving ROM.  This is a 26 y.o. male referred to outpatient physical therapy and " presents with a medical diagnosis of s/p R ACL with medial and lateral meniscus repairs and demonstrates limitations as described in the problem list Pt prognosis is Good. Pt will continue to benefit from skilled outpatient physical therapy to address the deficits listed below in the problem list, provide pt/family education and to maximize pt's level of independence in the home and community environment.    Will the patient continue to benefit from skilled PT intervention? yes        Medical necessity is demonstrated by:   - Pain limits function of effected part for all activities  - Unable to participate in daily activities     Progress towards goals: good     Plan   Continue with established Plan of Care towards PT goals.

## 2017-02-21 ENCOUNTER — CLINICAL SUPPORT (OUTPATIENT)
Dept: REHABILITATION | Facility: HOSPITAL | Age: 27
End: 2017-02-21
Attending: ORTHOPAEDIC SURGERY
Payer: COMMERCIAL

## 2017-02-21 DIAGNOSIS — M25.561 ACUTE PAIN OF RIGHT KNEE: ICD-10-CM

## 2017-02-21 PROCEDURE — 97110 THERAPEUTIC EXERCISES: CPT

## 2017-02-21 NOTE — PROGRESS NOTES
"                                                  Physical Therapy Daily Note     Diagnosis:   Encounter Diagnosis   Name Primary?    Acute pain of right knee      Physician: Margarito Machuca MD  Time in 1100  Time out 1200  Therex 60    Evaluation Date: 12/20/16  Date of Surgery: 12/19/16  Visit #: 17  Total Treatment Time:   Precautions: s/p  R ACL with meniscus protocol    Subjective     Pt reporting no pain when arrived for tx but reporting soreness last tx. Pain scale 0/10. Minimal compliance with HEP and compliance with RICE    Objective     Measurements taken: PROM flex 125 degrees prone, +2 ext in supine.  Decreased edema, healing incision     Range of motion (PROM)     Knee:  Left Right   Extension  0 -2   Flexion 145 125       Pt was instructed in and performed ther ex  to increase strength, endurance, ROM, flexibility and core stabilization with activities as follows:     North Nagi was instructed in and performed therapeutic exercises to develop strength, endurance, ROM, flexibility and core stabilization for 60 minutes including:   Stat bike x 10 min R5 for increased ROM   Patellar MOBS/PROM supine and prone L knee x10'  prone flex stretch with strap 2 min-np  Leg Press 120# 30x  Calf Press 100# 30x-np  Ecc quad 80# 2x15  Bridge on ball x30 with TA-np  Wall Squat 3 x fatigue NP  Clamshells btb B LE 3x fatigue  Heel taps 5" box 3x10  TKE 10# x30-np  Calf stretch x2 min  Lateral walk gtb x1 lap-np  bosu SL balance 10 sec x5  Reformer hip abd x30  Cone tap RDL x10  standing flex stretch on stairs 2 min-np  HSS R LE 2x/1' ea   GSS on slant  2x/1' ea    Supine hip flexor stretch x2 min np    Treatment ended with ice   x10 min     Written Home Exercises Provided: clamshells, heel taps and balance  Pt demo good understanding of the education provided. [place] Lazar demonstrated good return demonstration of activities.     Education provided:  [place] Lazar verbalized good understanding of education " provided.   No spiritual or educational barriers to learning identified.    Assessment   Patient remains most limited with heel taps, balance and glut med strength, but demonstrated a good understanding to add these exercises to HEP.   Pt  progressing well with ACL with meniscus repair protocol and would continue to benefit from PT intervention to reach functional goals.   This is a 26 y.o. male referred to outpatient physical therapy and presents with a medical diagnosis of s/p R ACL with medial and lateral meniscus repairs and demonstrates limitations as described in the problem list Pt prognosis is Good. Pt will continue to benefit from skilled outpatient physical therapy to address the deficits listed below in the problem list, provide pt/family education and to maximize pt's level of independence in the home and community environment.    Will the patient continue to benefit from skilled PT intervention? yes        Medical necessity is demonstrated by:   - Pain limits function of effected part for all activities  - Unable to participate in daily activities     Progress towards goals: good     Plan   Continue with established Plan of Care towards PT goals.     Trixie Amos, PT, DPT

## 2017-02-23 ENCOUNTER — CLINICAL SUPPORT (OUTPATIENT)
Dept: REHABILITATION | Facility: HOSPITAL | Age: 27
End: 2017-02-23
Attending: ORTHOPAEDIC SURGERY
Payer: COMMERCIAL

## 2017-02-23 DIAGNOSIS — M25.561 ACUTE PAIN OF RIGHT KNEE: Primary | ICD-10-CM

## 2017-02-23 PROCEDURE — 97110 THERAPEUTIC EXERCISES: CPT

## 2017-02-23 NOTE — PROGRESS NOTES
"                                                  Physical Therapy Daily Note     Diagnosis:   Encounter Diagnosis   Name Primary?    Acute pain of right knee Yes     Physician: Margarito Machuca MD  Time in 1100  Time out 1200  Therex 60    Evaluation Date: 16  Date of Surgery: 16  Visit #: 18  Total Treatment Time:   Precautions: s/p  R ACL with meniscus protocol    Subjective     Pt reporting no pain currently and usually does not have problems unless trying to do something out of the ordinary.  Pt reported going up and down stairs is probably the most difficult thing for him.    Objective     Measurements taken: PROM flex 125 degrees prone, +2 ext in supine. NP  Decreased edema, healing incision     Range of motion (PROM)     Knee:  Left Right   Extension  0 -2   Flexion 145 125       Pt was instructed in and performed ther ex  to increase strength, endurance, ROM, flexibility and core stabilization with activities as follows:     [place] Lazar was instructed in and performed therapeutic exercises to develop strength, endurance, ROM, flexibility and core stabilization for 60 minutes includin:1 w/ PTA x 30 mins    Stat bike x 10 min R5 for increased ROM   Patellar MOBS/PROM supine and prone L knee x10' NP  prone flex stretch with strap 2 min-np  Leg Press 120# 30x  Single leg press 70# 30 x  Calf Press 100# 30x-np  Ecc quad 80# 2x15 NP  Bridge on ball x30 with TA-np  Single leg bridges 2x10  Wall Squat 3 x fatigue NP  Clamshells btb B LE 3x fatigue  Heel taps 6" step 3x10  TKE 10# x30-np  Calf stretch x2 min  Lateral walk gtb x1 lap-np  bosu SL balance 10 sec x5  Reformer hip abd x30 NP  Cone tap RDL x10  standing flex stretch on stairs 2 min-np  HSS R LE 2x/1' ea   GSS on slant  2x/1' ea  Hip abduction machine 3 x 10 80#  Hip abduction machine 3 x 10 80#  Supine hip flexor stretch x2 min np    Treatment ended with ice  X 10 min     Written Home Exercises Provided: clamshells, heel taps and " balance  Pt demo good understanding of the education provided. [place] Lazar demonstrated good return demonstration of activities.     Education provided:  [place] Lazar verbalized good understanding of education provided.   No spiritual or educational barriers to learning identified.    Assessment   Patient reports performing new balance HEP at home and working on quad control with heel taps off step.  Pt was progressed to single leg presses as well as hip abduction/adduction machines today.   Pt  progressing well with ACL with meniscus repair protocol and would continue to benefit from PT intervention to reach functional goals.   This is a 26 y.o. male referred to outpatient physical therapy and presents with a medical diagnosis of s/p R ACL with medial and lateral meniscus repairs and demonstrates limitations as described in the problem list Pt prognosis is Good. Pt will continue to benefit from skilled outpatient physical therapy to address the deficits listed below in the problem list, provide pt/family education and to maximize pt's level of independence in the home and community environment.    Will the patient continue to benefit from skilled PT intervention? yes        Medical necessity is demonstrated by:   - Pain limits function of effected part for all activities  - Unable to participate in daily activities     Progress towards goals: good     Plan   Continue with established Plan of Care towards PT goals.     Margarito Ramon, PTA  2/23/17

## 2017-03-02 ENCOUNTER — CLINICAL SUPPORT (OUTPATIENT)
Dept: REHABILITATION | Facility: HOSPITAL | Age: 27
End: 2017-03-02
Attending: ORTHOPAEDIC SURGERY
Payer: COMMERCIAL

## 2017-03-02 DIAGNOSIS — M25.561 ACUTE PAIN OF RIGHT KNEE: Primary | ICD-10-CM

## 2017-03-02 PROCEDURE — 97110 THERAPEUTIC EXERCISES: CPT

## 2017-03-02 NOTE — PROGRESS NOTES
"                                                  Physical Therapy Daily Note     Diagnosis:   Encounter Diagnosis   Name Primary?    Acute pain of right knee Yes     Physician: Margarito Machuca MD  Time in 1100  Time out 1200  Therex 60    Evaluation Date: 12/20/16  Date of Surgery: 12/19/16  Visit #: 19  Total Treatment Time:   Precautions: s/p  R ACL with meniscus protocol    Subjective     Pt reporting no pain when arrived for tx but reporting soreness last tx. Pain scale 0/10. Minimal compliance with HEP and compliance with RICE    Objective     Measurements taken: PROM flex 125 degrees prone, +2 ext in supine.  Decreased edema, healing incision     Range of motion (PROM)     Knee:  Left Right   Extension  0 -2   Flexion 145 125       Pt was instructed in and performed ther ex  to increase strength, endurance, ROM, flexibility and core stabilization with activities as follows:     [place] Lazar was instructed in and performed therapeutic exercises to develop strength, endurance, ROM, flexibility and core stabilization for 60 minutes including:   Stat bike x 10 min R5 for increased ROM   Patellar MOBS/PROM supine and prone L knee x10'  prone flex stretch with strap 2 min-np  Leg Press 120# 30x  Calf Press 100# 30x-np  Ecc quad 80# 2x15  Bridge on ball x30 with TA-np  Wall Squat 3 x fatigue   Clamshells btb B LE 3x fatigue  Heel taps 5" box 3x10  TKE 10# x30-  Calf stretch x2 min  Lateral walk gtb x1 lap-np  bosu SL balance 10 sec x5 NP  SLS ball toss on foam 30x ea direction  Reformer hip abd x30  Shuffle 3 laps  Carioca 3 laps  Cone tap RDL x30  standing flex stretch on stairs 2 min-np  HSS 2 min' ea   GSS on slant  2 min    Supine hip flexor stretch x2 min np    Treatment ended with ice   x10 min     Written Home Exercises Provided: clamshells, heel taps and balance  Pt demo good understanding of the education provided. [place] Lazar demonstrated good return demonstration of activities.     Education " provided:  [place] Lazar verbalized good understanding of education provided.   No spiritual or educational barriers to learning identified.    Assessment   Patient remains most limited with heel taps, balance and glut med strength, but demonstrated a good understanding to progress with these activities today.   Pt  progressing well with ACL with meniscus repair protocol and would continue to benefit from PT intervention to reach functional goals.   This is a 26 y.o. male referred to outpatient physical therapy and presents with a medical diagnosis of s/p R ACL with medial and lateral meniscus repairs and demonstrates limitations as described in the problem list Pt prognosis is Good. Pt will continue to benefit from skilled outpatient physical therapy to address the deficits listed below in the problem list, provide pt/family education and to maximize pt's level of independence in the home and community environment.    Will the patient continue to benefit from skilled PT intervention? yes        Medical necessity is demonstrated by:   - Pain limits function of effected part for all activities  - Unable to participate in daily activities     Progress towards goals: good     Plan   Continue with established Plan of Care towards PT goals.

## 2017-03-06 ENCOUNTER — CLINICAL SUPPORT (OUTPATIENT)
Dept: REHABILITATION | Facility: HOSPITAL | Age: 27
End: 2017-03-06
Attending: ORTHOPAEDIC SURGERY
Payer: COMMERCIAL

## 2017-03-06 DIAGNOSIS — M25.561 ACUTE PAIN OF RIGHT KNEE: ICD-10-CM

## 2017-03-06 DIAGNOSIS — S83.511A COMPLETE TEAR OF RIGHT ACL, INITIAL ENCOUNTER: Primary | ICD-10-CM

## 2017-03-06 PROCEDURE — 97110 THERAPEUTIC EXERCISES: CPT

## 2017-03-06 NOTE — PROGRESS NOTES
Physical Therapy Daily Note     Diagnosis:   Encounter Diagnosis   Name Primary?    Complete tear of right ACL, initial encounter Yes     Physician: Margarito Machuca MD    Evaluation Date: 12/20/16  Date of Surgery: 12/19/16  Visit #: 20  Total Treatment Time:   Precautions: s/p  R ACL with meniscus protocol    Subjective     Pt reports feeling fine without pain. Pt is 12 weeks after surgery.     Objective     Foto done on 3/6/2017  Status: 62% - limitation:38%   Goal status:CK - at least 40% but less than 60%  Current status:CJ - at least 20% but less than 60%     Pt was instructed in and performed ther ex  to increase strength, endurance, ROM, flexibility and core stabilization with activities as follows:     [place] Lazar was instructed in and performed therapeutic exercises to develop strength, endurance, ROM, flexibility and core stabilization for 60 minutes including:   Elliptical 10 minutes   prone flex stretch with strap 2 min  Leg Press 120# 30  Bridge on ball x30 with TA  Wall Squat 3 x fatigue   Modified lunges on pilates box 3 x 10 reps   Heel raises SLE 2 x 15 reps   Dead lift 3 x 10 reps   Gentle ladder drill on floor - working on coordination more than jumping   TKE 10# x30  Modified lunges on pilates box 4 springs 3 x 10 reps   Calf stretch x2 min  SLS ball toss on foam 30x ea direction  Shuffle 3 laps  standing flex stretch on stairs 2 min   HSS 2 min' ea   GSS on slant  2 min  Supine hip flexor stretch x2 min     Treatment ended with ice   x10 min     Written Home Exercises Provided: clamshells, heel taps and balance  Pt demo good understanding of the education provided. [place] Lazar demonstrated good return demonstration of activities.     Education provided:  [place] Lazar verbalized good understanding of education provided.   No spiritual or educational barriers to learning identified.    Assessment   Patient with good tolerance to PT  today, no increase of pain during session but muscle fatigue at the end of session. Pt  progressing well with ACL with meniscus repair protocol and would continue to benefit from PT intervention to reach functional goals.   This is a 26 y.o. male referred to outpatient physical therapy and presents with a medical diagnosis of s/p R ACL with medial and lateral meniscus repairs and demonstrates limitations as described in the problem list Pt prognosis is Good. Pt will continue to benefit from skilled outpatient physical therapy to address the deficits listed below in the problem list, provide pt/family education and to maximize pt's level of independence in the home and community environment.    Will the patient continue to benefit from skilled PT intervention? yes        Medical necessity is demonstrated by:   - Pain limits function of effected part for all activities  - Unable to participate in daily activities     Progress towards goals: good     Plan   Continue with established Plan of Care towards PT goals.

## 2017-03-09 ENCOUNTER — OFFICE VISIT (OUTPATIENT)
Dept: SPORTS MEDICINE | Facility: CLINIC | Age: 27
End: 2017-03-09
Payer: COMMERCIAL

## 2017-03-09 ENCOUNTER — CLINICAL SUPPORT (OUTPATIENT)
Dept: REHABILITATION | Facility: HOSPITAL | Age: 27
End: 2017-03-09
Attending: ORTHOPAEDIC SURGERY
Payer: COMMERCIAL

## 2017-03-09 VITALS
HEIGHT: 74 IN | DIASTOLIC BLOOD PRESSURE: 85 MMHG | SYSTOLIC BLOOD PRESSURE: 143 MMHG | HEART RATE: 98 BPM | WEIGHT: 190 LBS | BODY MASS INDEX: 24.38 KG/M2

## 2017-03-09 DIAGNOSIS — M25.561 ACUTE PAIN OF RIGHT KNEE: ICD-10-CM

## 2017-03-09 DIAGNOSIS — Z98.890 S/P ACL RECONSTRUCTION: Primary | ICD-10-CM

## 2017-03-09 DIAGNOSIS — Z98.890 POST-OPERATIVE STATE: ICD-10-CM

## 2017-03-09 PROCEDURE — 99024 POSTOP FOLLOW-UP VISIT: CPT | Mod: S$GLB,,, | Performed by: ORTHOPAEDIC SURGERY

## 2017-03-09 PROCEDURE — 97110 THERAPEUTIC EXERCISES: CPT

## 2017-03-09 PROCEDURE — 99999 PR PBB SHADOW E&M-EST. PATIENT-LVL II: CPT | Mod: PBBFAC,,, | Performed by: ORTHOPAEDIC SURGERY

## 2017-03-09 NOTE — PROGRESS NOTES
"CC: Right knee scope post op 3 months.  Physical therapy 2 x week at [facility] location.  Doing well.    DATE OF PROCEDURE: 12/19/2016      PREOPERATIVE DIAGNOSES:   1. Right knee anterior cruciate ligament tear.   2. Right knee medial meniscus tear  3. Right knee lateral meniscus tear  4. Right knee medial femoral condyle cartilage injury     POSTOPERATIVE DIAGNOSES:   1. Right knee anterior cruciate ligament tear.   2. Right knee medial meniscus tear  3. Right knee lateral meniscus tear  4. Right knee medial femoral condyle cartilage injury     PROCEDURES:   1. Right knee anterior cruciate ligament reconstruction with ipsilateral bone-patellar tendon-bone autograft.   2. Right knee arthroscopic medial and lateral meniscus repairs  3. Right knee arthroscopic chondroplasty / debridement medial femoral condyle     SURGEON: Margarito Machuca M.D.    PE:    BP (!) 143/85  Pulse 98  Ht 6' 2" (1.88 m)  Wt 86.2 kg (190 lb)  BMI 24.39 kg/m2     Right knee:    Incision clean/dry/intact  No sign of infection  Mild swelling  Compartments soft  Neurovascular status intact in extremity    FROM  Good quad strength  No effusion  No tenderness over medial or lateral joint lines  Negative lachman    ROM: 0-130    Assessment:  3 months s/p right knee ACL     Plan:  1. Continue physical therapy  2 Follow up 3 months      "

## 2017-03-09 NOTE — PROGRESS NOTES
"                                                  Physical Therapy Daily Note     Diagnosis:   Encounter Diagnosis   Name Primary?    Acute pain of right knee      Physician: Margarito Machuca MD    Evaluation Date: 12/20/16  Date of Surgery: 12/19/16  Visit #: 21  Total Treatment Time:   Precautions: s/p  R ACL with meniscus protocol    Subjective     Pt reports feeling good without pain. Pt is 12 weeks after surgery.     Objective     Foto done on 3/6/2017  Status: 62% - limitation:38%   Goal status:CK - at least 40% but less than 60%  Current status:CJ - at least 20% but less than 60%     Knee ROM:  Right:  Flex: 130 degrees  Ext:0 degrees    Left:  Flex: 145 degrees  Ext: 0 degrees    RUE:  4+/5 hip abd  5/5 knee flex and ext  + sit to stand    Pt was instructed in and performed ther ex  to increase strength, endurance, ROM, flexibility and core stabilization with activities as follows:     [place] Lazar was instructed in and performed therapeutic exercises to develop strength, endurance, ROM, flexibility and core stabilization for 60 minutes including:   Elliptical 10 minutes   Leg Press 120# 30-np  Bridge 2x15  Wall Squat 3 x fatigue -np  Modified lunges on pilates box 3 x 10 reps -np  Heel raises SLE 2 x 15 reps -np  Dead lift 3 x 10 reps   Gentle ladder drill on floor - working on coordination more than jumping (lateral and fwd)  TKE 10# x30-np  Modified lunges on pilates box 4 springs 3 x 10 reps -np  SLS ball toss on foam 30x ea direction-np  Shuffle 3 laps  standing flex stretch on stairs 2 min -np  HSS 2 min' ea   GSS on slant  2 min  Supine hip flexor stretch x2 min   High kneel on foam 3 x30 sec  Lateral walking gtb x1 lap  Clamshells gtb 3x fatigue  Heel taps 4" box x30    Treatment ended with ice   x10 min     Written Home Exercises Provided: clamshells, heel taps and balance  Pt demo good understanding of the education provided. [place] Lazar demonstrated good return demonstration of " activities.     Education provided:  [place] Lazar verbalized good understanding of education provided.   No spiritual or educational barriers to learning identified.    Assessment   Upon reassessment patient shows good ROM and is progressing well with strength, however still remains weak in R hip abd and eccentric quad control. Patient demonstrated a good understanding of HEP and progression of treatment. Pt  progressing well with ACL with meniscus repair protocol and would continue to benefit from PT intervention to reach functional goals.   This is a 26 y.o. male referred to outpatient physical therapy and presents with a medical diagnosis of s/p R ACL with medial and lateral meniscus repairs and demonstrates limitations as described in the problem list Pt prognosis is Good. Pt will continue to benefit from skilled outpatient physical therapy to address the deficits listed below in the problem list, provide pt/family education and to maximize pt's level of independence in the home and community environment.    Will the patient continue to benefit from skilled PT intervention? yes        Medical necessity is demonstrated by:   - Pain limits function of effected part for all activities  - Unable to participate in daily activities     Progress towards goals: good     Plan   Continue with established Plan of Care towards PT goals.

## 2017-03-13 ENCOUNTER — CLINICAL SUPPORT (OUTPATIENT)
Dept: REHABILITATION | Facility: HOSPITAL | Age: 27
End: 2017-03-13
Attending: ORTHOPAEDIC SURGERY
Payer: COMMERCIAL

## 2017-03-13 DIAGNOSIS — M25.561 ACUTE PAIN OF RIGHT KNEE: ICD-10-CM

## 2017-03-13 PROCEDURE — 97110 THERAPEUTIC EXERCISES: CPT

## 2017-03-15 NOTE — PROGRESS NOTES
"                                                  Physical Therapy Daily Note     Diagnosis:   Encounter Diagnosis   Name Primary?    Acute pain of right knee      Physician: Margarito Machuca MD    Evaluation Date: 12/20/16  Date of Surgery: 12/19/16  Visit #: 22  Total Treatment Time:   Precautions: s/p  R ACL with meniscus protocol    Subjective     Pt reports feeling fine without pain.     Objective     Foto done on 3/6/2017  Status: 62% - limitation:38%   Goal status:CK - at least 40% but less than 60%  Current status:CJ - at least 20% but less than 60%     Knee ROM:  Right:  Flex: 130 degrees  Ext:0 degrees    Left:  Flex: 145 degrees  Ext: 0 degrees    RUE:  4+/5 hip abd  5/5 knee flex and ext  + sit to stand    Pt was instructed in and performed ther ex  to increase strength, endurance, ROM, flexibility and core stabilization with activities as follows:     [place] Lazar was instructed in and performed therapeutic exercises to develop strength, endurance, ROM, flexibility and core stabilization for 60 minutes including:   Elliptical 10 minutes   Leg Press 120# 30-np  Bridge 2x15  Wall Squat 3 x fatigue -np  Modified lunges on pilates box 3 x 10 reps -np  Heel raises SLE 2 x 15 reps -np  Dead lift 3 x 10 reps   Gentle ladder drill on floor - working on coordination more than jumping (lateral and fwd)  TKE 10# x30-np  Modified lunges on pilates box 4 springs 3 x 10 reps -np  SLS ball toss on foam 30x ea direction-np  Shuffle 3 laps  standing flex stretch on stairs 2 min -np  HSS 2 min' ea   GSS on slant  2 min  Supine hip flexor stretch x2 min   High kneel on foam 3 x30 sec  Lateral walking gtb x1 lap  Clamshells gtb 3x fatigue  Heel taps 4" box x30    Treatment ended with ice   x10 min     Written Home Exercises Provided: clamshells, heel taps and balance  Pt demo good understanding of the education provided. [place] Lazar demonstrated good return demonstration of activities.     Education " provided:  [place] Lazar verbalized good understanding of education provided.   No spiritual or educational barriers to learning identified.    Assessment   Upon reassessment patient shows good ROM and is progressing well with strength, however still remains weak in R hip abd and eccentric quad control. Patient demonstrated a good understanding of HEP and progression of treatment. Pt  progressing well with ACL with meniscus repair protocol and would continue to benefit from PT intervention to reach functional goals.   This is a 26 y.o. male referred to outpatient physical therapy and presents with a medical diagnosis of s/p R ACL with medial and lateral meniscus repairs and demonstrates limitations as described in the problem list Pt prognosis is Good. Pt will continue to benefit from skilled outpatient physical therapy to address the deficits listed below in the problem list, provide pt/family education and to maximize pt's level of independence in the home and community environment.    Will the patient continue to benefit from skilled PT intervention? yes        Medical necessity is demonstrated by:   - Pain limits function of effected part for all activities  - Unable to participate in daily activities     Progress towards goals: good     Plan   Continue with established Plan of Care towards PT goals.

## 2017-03-21 ENCOUNTER — CLINICAL SUPPORT (OUTPATIENT)
Dept: REHABILITATION | Facility: HOSPITAL | Age: 27
End: 2017-03-21
Attending: ORTHOPAEDIC SURGERY
Payer: COMMERCIAL

## 2017-03-21 DIAGNOSIS — M25.561 ACUTE PAIN OF RIGHT KNEE: ICD-10-CM

## 2017-03-21 PROCEDURE — 97110 THERAPEUTIC EXERCISES: CPT

## 2017-03-21 NOTE — PROGRESS NOTES
"                                                  Physical Therapy Daily Note     Diagnosis:   Encounter Diagnosis   Name Primary?    Acute pain of right knee      Physician: Margarito Machuca MD    Evaluation Date: 12/20/16  Date of Surgery: 12/19/16  Visit #: 23  Total Treatment Time:   Precautions: s/p  R ACL with meniscus protocol    Subjective     Pt reports feeling fine without pain only some crepitus noted.    Objective     Foto done on 3/6/2017  Status: 62% - limitation:38%   Goal status:CK - at least 40% but less than 60%  Current status:CJ - at least 20% but less than 60%     Knee ROM:  Right:  Flex: 130 degrees  Ext:0 degrees    Left:  Flex: 145 degrees  Ext: 0 degrees    RUE:  4+/5 hip abd  5/5 knee flex and ext  + sit to stand    Pt was instructed in and performed ther ex  to increase strength, endurance, ROM, flexibility and core stabilization with activities as follows:     [place]azar was instructed in and performed therapeutic exercises to develop strength, endurance, ROM, flexibility and core stabilization for 60 minutes including:   Elliptical 10 minutes -np  Stat bike x10 min  Leg Press 120# 30-np  Bridge 2x15  Wall Squat 3 x fatigue -np  Modified lunges on pilates box 3 x 10 reps -np  Heel raises SLE 2 x 15 reps -np  Dead lift 3 x 10 reps   Gentle ladder drill on floor - working on coordination more than jumping (lateral and fwd) x2 laps  TKE 10# x30-np  Mini squats in mirror x1 min  Modified lunges on pilates box 4 springs 3 x 10 reps -np  SLS on bosu 3x 30 sec balance  Shuffle 3 laps  standing flex stretch on stairs 2 min -np  HSS 2 min' ea   GSS on slant  2 min  Supine hip flexor stretch x2 min   High kneel on foam 3 x30 sec  Lateral walking gtb x1 lap  Clamshells gtb 3x fatigue  Heel taps 4" box x30  Shuttle 1 bottom cable 2x 1 min prancing  Shuttle sidelying R leg ext x30 25#  Reformer hip abd x2 springs(harder) x30 B    Treatment ended with ice   x10 min     Written Home Exercises " Provided: clamshells, heel taps and balance  Pt demo good understanding of the education provided. [place]azar demonstrated good return demonstration of activities.     Education provided:  [place]azar verbalized good understanding of education provided.   No spiritual or educational barriers to learning identified.    Assessment   Patient tolerated prancing well on shuttle and is improving knee control with squatting and continues to show good alignment.  Patient demonstrated a good understanding of HEP and progression of treatment. Pt  progressing well with ACL with meniscus repair protocol and would continue to benefit from PT intervention to reach functional goals.   This is a 26 y.o. male referred to outpatient physical therapy and presents with a medical diagnosis of s/p R ACL with medial and lateral meniscus repairs and demonstrates limitations as described in the problem list Pt prognosis is Good. Pt will continue to benefit from skilled outpatient physical therapy to address the deficits listed below in the problem list, provide pt/family education and to maximize pt's level of independence in the home and community environment.    Will the patient continue to benefit from skilled PT intervention? yes        Medical necessity is demonstrated by:   - Pain limits function of effected part for all activities  - Unable to participate in daily activities     Progress towards goals: good     Plan   Continue with established Plan of Care towards PT goals.         Trixie Amos, PT, DPT

## 2017-03-28 ENCOUNTER — CLINICAL SUPPORT (OUTPATIENT)
Dept: REHABILITATION | Facility: HOSPITAL | Age: 27
End: 2017-03-28
Attending: ORTHOPAEDIC SURGERY
Payer: COMMERCIAL

## 2017-03-28 DIAGNOSIS — M25.561 ACUTE PAIN OF RIGHT KNEE: ICD-10-CM

## 2017-03-28 PROCEDURE — 97110 THERAPEUTIC EXERCISES: CPT

## 2017-03-28 NOTE — PROGRESS NOTES
Physical Therapy Daily Note     Diagnosis:   Encounter Diagnosis   Name Primary?    Acute pain of right knee      Physician: Margarito Machuca MD    Evaluation Date: 12/20/16  Date of Surgery: 12/19/16  Visit #: 24  Total Treatment Time: 70  Precautions: s/p  R ACL with meniscus protocol    Subjective     Pt reports feeling a little soreness today.  Pain: 0/10    Objective     Foto done on 3/6/2017  Status: 62% - limitation:38%   Goal status:CK - at least 40% but less than 60%  Current status:CJ - at least 20% but less than 60%     Knee ROM:  Right:  Flex: 130 degrees  Ext:0 degrees    Left:  Flex: 145 degrees  Ext: 0 degrees    RUE:  4+/5 hip abd  5/5 knee flex and ext  + sit to stand    Pt was instructed in and performed ther ex  to increase strength, endurance, ROM, flexibility and core stabilization with activities as follows:     [place] was instructed in and performed therapeutic exercises to develop strength, endurance, ROM, flexibility and core stabilization for 60 minutes including:   Elliptical 10 minutes   Stat bike x10 min-np  Leg Press 120# 30-np  Bridge 2x15-np  Wall Squat 3 x fatigue -np  Modified lunges on pilates box 3 x 10 reps -np  Heel raises SLE 2 x 15 reps -np  RDLs with airplane 3 x 10 reps   Ladder drill on floor - working on coordination more than jumping (lateral and fwd) x2 laps  TKE 10# x30-np  Mini squats in mirror x1 min  Modified lunges on pilates box 4 springs 3 x 10 reps -np  SLS on bosu 3x 30 sec balance-np  Shuffle 3 laps-np  standing flex stretch on stairs 2 min -np  HSS 1 min' ea   GSS on slant  2 min-np  Supine hip flexor stretch x2 min   High kneel on foam x1 min  Lateral walking gtb x1 lap-np  Clamshells gtb 3x fatigue  Heel taps yellow box x30  bosu rebouder 2x 15 SL  Shuttle 1 bottom cable 2x 1 min prancing-np  Shuttle sidelying R leg ext x30 25#-np  Reformer hip abd x2 springs(harder) x30 B  Prone hip ext on ball  x10 B  Supine HS curls 2x15 on ball    Treatment ended with ice   x10 min     Written Home Exercises Provided: clamshells, heel taps and balance  Pt demo good understanding of the education provided. [place] demonstrated good return demonstration of activities.     Education provided:  [place]azar verbalized good understanding of education provided.   No spiritual or educational barriers to learning identified.    Assessment   Patient showed improved quad strength and muscle control with heel taps on a higher platform. Patient still demonstrates decreased balance and hip/quad weakenss, but progressing well per protocol. Patient demonstrated a good understanding of HEP and progression of treatment. Pt  progressing well with ACL with meniscus repair protocol and would continue to benefit from PT intervention to reach functional goals.   This is a 26 y.o. male referred to outpatient physical therapy and presents with a medical diagnosis of s/p R ACL with medial and lateral meniscus repairs and demonstrates limitations as described in the problem list Pt prognosis is Good. Pt will continue to benefit from skilled outpatient physical therapy to address the deficits listed below in the problem list, provide pt/family education and to maximize pt's level of independence in the home and community environment.    Will the patient continue to benefit from skilled PT intervention? yes        Medical necessity is demonstrated by:   - Pain limits function of effected part for all activities  - Unable to participate in daily activities     Progress towards goals: good     Plan   Continue with established Plan of Care towards PT goals.         Trixie Amos, PT, DPT

## 2017-04-06 ENCOUNTER — CLINICAL SUPPORT (OUTPATIENT)
Dept: REHABILITATION | Facility: HOSPITAL | Age: 27
End: 2017-04-06
Attending: ORTHOPAEDIC SURGERY
Payer: COMMERCIAL

## 2017-04-06 DIAGNOSIS — M25.561 ACUTE PAIN OF RIGHT KNEE: ICD-10-CM

## 2017-04-06 PROCEDURE — 97110 THERAPEUTIC EXERCISES: CPT

## 2017-04-06 NOTE — PROGRESS NOTES
Physical Therapy Daily Note     Diagnosis:   Encounter Diagnosis   Name Primary?    Acute pain of right knee      Physician: Margarito Machuca MD    Evaluation Date: 12/20/16  Date of Surgery: 12/19/16  Visit #: 25  Total Treatment Time: 70  Precautions: s/p  R ACL with meniscus protocol    Subjective     Pt reports feeling a little soreness today.  Pain: 0/10    Objective     Foto done on 3/6/2017  Status: 62% - limitation:38%   Goal status:CK - at least 40% but less than 60%  Current status:CJ - at least 20% but less than 60%     Knee ROM:  Right:  Flex: 130 degrees  Ext:0 degrees    Left:  Flex: 145 degrees  Ext: 0 degrees    RUE:  4+/5 hip abd  5/5 knee flex and ext  + sit to stand    Pt was instructed in and performed ther ex  to increase strength, endurance, ROM, flexibility and core stabilization with activities as follows:     [place] was instructed in and performed therapeutic exercises to develop strength, endurance, ROM, flexibility and core stabilization for 60 minutes including:   Elliptical 10 minutes   Stat bike x10 min-np  Leg Press 120# 30-np  Bridge 2x15-np  Wall Squat 3 x fatigue -np  Modified lunges on pilates box 3 x 10 reps -np  Heel raises SLE 2 x 15 reps -np  RDLs with airplane 3 x 10 reps and medicine ball  Ladder drill on floor - working on coordination more than jumping (lateral and fwd) x2 laps-np  TKE 10# x30-np  Mini squats in mirror 2x1 min  Modified lunges on pilates box 4 springs 3 x 10 reps -np  SLS on bosu 3x 30 sec balance-np  Shuffle 3 laps-np  standing flex stretch on stairs 2 min -np  HSS 1 min' ea   GSS on slant  2 min-np  Supine hip flexor stretch x2 min   High kneel on foam x1 min  Lateral walking gtb x1 lap-np  Clamshells gtb 3x fatigue-np  Heel taps yellow box x30  bosu rebouder 2x 15 SL  Shuttle 1 bottom cable 2x 1 min prancing-np  Shuttle sidelying R leg ext x30 25#-np  Reformer hip abd x2 springs(harder) x30  B  Prone hip ext on ball 2x15 B  Supine HS curls 2x15 on ball-np  Ball prone hip flex 2x15  Monster walks x2 laps btb  Ballet bridges mtb 3x30 sec  Lunges x2 laps    Treatment ended with ice   x10 min     Written Home Exercises Provided: clamshells, heel taps and balance  Pt demo good understanding of the education provided. [place] demonstrated good return demonstration of activities.     Education provided:  [place] verbalized good understanding of education provided.   No spiritual or educational barriers to learning identified.    Assessment   Patient showed better balance and core stability this visit. He is showed good form with lunges and had no pain. Patient demonstrated a good understanding of HEP and progression of treatment. Pt  progressing well with ACL with meniscus repair protocol and would continue to benefit from PT intervention to reach functional goals.   This is a 26 y.o. male referred to outpatient physical therapy and presents with a medical diagnosis of s/p R ACL with medial and lateral meniscus repairs and demonstrates limitations as described in the problem list Pt prognosis is Good. Pt will continue to benefit from skilled outpatient physical therapy to address the deficits listed below in the problem list, provide pt/family education and to maximize pt's level of independence in the home and community environment.    Will the patient continue to benefit from skilled PT intervention? yes        Medical necessity is demonstrated by:   - Pain limits function of effected part for all activities  - Unable to participate in daily activities     Progress towards goals: good     Plan   Continue with established Plan of Care towards PT goals.         Trixie Amos, PT, DPT

## 2017-04-13 ENCOUNTER — CLINICAL SUPPORT (OUTPATIENT)
Dept: REHABILITATION | Facility: HOSPITAL | Age: 27
End: 2017-04-13
Attending: ORTHOPAEDIC SURGERY
Payer: COMMERCIAL

## 2017-04-13 DIAGNOSIS — M25.561 ACUTE PAIN OF RIGHT KNEE: ICD-10-CM

## 2017-04-13 PROCEDURE — 97110 THERAPEUTIC EXERCISES: CPT

## 2017-04-13 NOTE — PROGRESS NOTES
Physical Therapy Daily Note     Diagnosis:   Encounter Diagnosis   Name Primary?    Acute pain of right knee      Physician: Margarito Machuca MD    Evaluation Date: 12/20/16  Date of Surgery: 12/19/16  Visit #: 26  Total Treatment Time: 70  Precautions: s/p  R ACL with meniscus protocol    Subjective     Pt reports feeling a little soreness today.  Pain: 0/10    Objective     Foto done on 3/6/2017  Status: 62% - limitation:38%   Goal status:CK - at least 40% but less than 60%  Current status:CJ - at least 20% but less than 60%     Knee ROM:  Right:  Flex: 130 degrees  Ext:0 degrees    Left:  Flex: 145 degrees  Ext: 0 degrees    RUE:  4+/5 hip abd  5/5 knee flex and ext  + sit to stand    Pt was instructed in and performed ther ex  to increase strength, endurance, ROM, flexibility and core stabilization with activities as follows:     [place] was instructed in and performed therapeutic exercises to develop strength, endurance, ROM, flexibility and core stabilization for 60 minutes including:   Elliptical 10 minutes   Stat bike x10 min-np  Leg Press 120# 30-np  Bridge 2x15-np  Heel raises SLE 2 x 15 reps -np  RDLs with airplane 3 x 10 reps and medicine ball  Mini squats in mirror 2x1 min  SLS on bosu 3x 30 sec balance-np  Shuffle 3 laps-np  standing flex stretch on stairs 2 min -np  HSS 1 min' ea   GSS on slant  2 min-  Supine hip flexor stretch manual resistance 2x 30sec  High kneel on foam x1 min  Lateral walking btb x1 lap-  Clamshells gtb 3x fatigue-np  Heel taps yellow box x30  bosu rebouder 2x 15 SL-np  Shuttle 1 bottom cable 2x 1 min prancing-np  Shuttle sidelying R leg ext x30 25#-np  Reformer hip abd x2 springs(harder) x30 B-np  Prone hip ext on ball 2x15 B  Supine HS curls 2x15 on ball  Ball prone hip flex 2x15  Monster walks x2 laps btb  Ballet bridges mtb 3x30 sec  Lunges x1 lap with medicine ball and oblique twist  OKC hammer x30  Monster walks  btb x2 laps      Treatment ended with ice   x10 min     Written Home Exercises Provided: clamshells, heel taps and balance  Pt demo good understanding of the education provided. [place] demonstrated good return demonstration of activities.     Education provided:  [place]azar verbalized good understanding of education provided.   No spiritual or educational barriers to learning identified.    Assessment   Patient tolerate better core stability and improved hip strength. He is showing good progress with core stability. Patient demonstrated a good understanding of HEP and progression of treatment. Pt  progressing well with ACL with meniscus repair protocol and would continue to benefit from PT intervention to reach functional goals.   This is a 26 y.o. male referred to outpatient physical therapy and presents with a medical diagnosis of s/p R ACL with medial and lateral meniscus repairs and demonstrates limitations as described in the problem list Pt prognosis is Good. Pt will continue to benefit from skilled outpatient physical therapy to address the deficits listed below in the problem list, provide pt/family education and to maximize pt's level of independence in the home and community environment.    Will the patient continue to benefit from skilled PT intervention? yes        Medical necessity is demonstrated by:   - Pain limits function of effected part for all activities  - Unable to participate in daily activities     Progress towards goals: good     Plan   Continue with established Plan of Care towards PT goals.         Trixie Amos, PT, DPT

## 2017-04-20 ENCOUNTER — CLINICAL SUPPORT (OUTPATIENT)
Dept: REHABILITATION | Facility: HOSPITAL | Age: 27
End: 2017-04-20
Attending: ORTHOPAEDIC SURGERY
Payer: COMMERCIAL

## 2017-04-20 DIAGNOSIS — M25.561 ACUTE PAIN OF RIGHT KNEE: ICD-10-CM

## 2017-04-20 PROCEDURE — 97110 THERAPEUTIC EXERCISES: CPT

## 2017-04-20 NOTE — PROGRESS NOTES
Physical Therapy Daily Note     Diagnosis:   Encounter Diagnosis   Name Primary?    Acute pain of right knee      Physician: Margarito Machuca MD    Evaluation Date: 12/20/16  Date of Surgery: 12/19/16  Visit #: 27  Total Treatment Time: 70  Precautions: s/p  R ACL with meniscus protocol    Subjective     Pt reports feeling okay today.  Pain: 0/10    Objective     Foto done on 3/6/2017  Status: 62% - limitation:38%   Goal status:CK - at least 40% but less than 60%  Current status:CJ - at least 20% but less than 60%     Knee ROM:  Right:  Flex: 130 degrees  Ext:0 degrees    Left:  Flex: 145 degrees  Ext: 0 degrees    RUE:  4+/5 hip abd  5/5 knee flex and ext  + sit to stand    Pt was instructed in and performed ther ex  to increase strength, endurance, ROM, flexibility and core stabilization with activities as follows:     [place] was instructed in and performed therapeutic exercises to develop strength, endurance, ROM, flexibility and core stabilization for 60 minutes including:   Elliptical 10 minutes   Stat bike x10 min-np  RDLs with airplane 3 x 10 reps and medicine ball  Mini squats in mirror 2x1 min-np  SLS on bosu 3x 30 sec balance-np  HSS 1 min' ea   GSS on slant  2 min-  Supine hip flexor stretch manual resistance 2x 30sec  High kneel on foam x1 min-np  Lateral walking btb x1 lap-  Clamshells gtb 3x fatigue-np  Heel taps yellow box x30  bosu rebouder 2x 15 SL-np  Prone hip ext on ball 2x15 B  Supine HS curls 2x15 on ball  Ball prone hip flex 2x15  Monster walks x2 laps btb  Ballet bridges mtb 3x30 sec-np  Lateral jumps x10  Lunges x2 lap with medicine ball and oblique twist  OKC hammer x30-np  3 way mini squat x10      Treatment ended with ice   x10 min     Written Home Exercises Provided: clamshells, heel taps and balance  Pt demo good understanding of the education provided. [place] demonstrated good return demonstration of activities.      Education provided:  [place] verbalized good understanding of education provided.   No spiritual or educational barriers to learning identified.    Assessment   Patient showing good quad control with lateral jumping, however shows increased muscle fatigue indicated by shaking. Patient showed less instability with core training. Patient noted a little pain with concentric work of quad during lunges. Patient demonstrated a good understanding of HEP and progression of treatment. Pt  progressing well with ACL with meniscus repair protocol and would continue to benefit from PT intervention to reach functional goals.   This is a 26 y.o. male referred to outpatient physical therapy and presents with a medical diagnosis of s/p R ACL with medial and lateral meniscus repairs and demonstrates limitations as described in the problem list Pt prognosis is Good. Pt will continue to benefit from skilled outpatient physical therapy to address the deficits listed below in the problem list, provide pt/family education and to maximize pt's level of independence in the home and community environment.    Will the patient continue to benefit from skilled PT intervention? yes        Medical necessity is demonstrated by:   - Pain limits function of effected part for all activities  - Unable to participate in daily activities     Progress towards goals: good     Plan   Continue with established Plan of Care towards PT goals.         Trixie Amos, PT, DPT

## 2017-04-26 ENCOUNTER — CLINICAL SUPPORT (OUTPATIENT)
Dept: REHABILITATION | Facility: HOSPITAL | Age: 27
End: 2017-04-26
Attending: ORTHOPAEDIC SURGERY
Payer: COMMERCIAL

## 2017-04-26 DIAGNOSIS — M25.561 ACUTE PAIN OF RIGHT KNEE: ICD-10-CM

## 2017-04-26 PROCEDURE — 97110 THERAPEUTIC EXERCISES: CPT

## 2017-04-26 NOTE — PROGRESS NOTES
"                                                  Physical Therapy Daily Note     Diagnosis:   Encounter Diagnosis   Name Primary?    Acute pain of right knee      Physician: Margarito Machuca MD    Evaluation Date: 12/20/16  Date of Surgery: 12/19/16  Visit #: 28  Total Treatment Time: 60  Precautions: s/p  R ACL with meniscus protocol    Subjective     Pt reports feeling okay today and was not sore after last visit.  Pain: 0/10    Objective     Foto done on 3/6/2017  Status: 62% - limitation:38%   Goal status:CK - at least 40% but less than 60%  Current status:CJ - at least 20% but less than 60%     Knee ROM:  Right:  Flex: 130 degrees  Ext:0 degrees    Left:  Flex: 145 degrees  Ext: 0 degrees    RUE:  4+/5 hip abd  5/5 knee flex and ext  + sit to stand    Pt was instructed in and performed ther ex  to increase strength, endurance, ROM, flexibility and core stabilization with activities as follows:     [place] was instructed in and performed therapeutic exercises to develop strength, endurance, ROM, flexibility and core stabilization for 60 minutes including:   Elliptical 10 minutes   RDLs with airplane 3 x 10 reps and medicine ball-np  Mini squats in mirror 2x1 min-np  SLS on bosu 3x 30 sec balance-np  HSS 1 min' ea -np  GSS on slant  2 min-np  Supine hip flexor stretch manual resistance x1 min B  High kneel on foam x1 min-np  Lateral walking btb x2 laps  Clamshells gtb 3x fatigue-np  Heel taps yellow box x30  bosu rebouder 2x 15 SL-np  Prone hip ext on ball x10 B  Supine HS curls 2x15 on ball  Ball prone hip flex 2x15  Monster walks x2 laps btb  Ballet bridges mtb 3x30 sec-np  Lateral jumps 2x 1 min  Lunges x2 lap with medicine ball and oblique twist  OK[employer] hammer x30-np  3 way mini squat x10  Step offs 2" box 20 x 5 sec hold  MR knee ext on bolster stretch 2x 1 min      Treatment ended with ice   x10 min -np    Written Home Exercises Provided: clamshells, heel taps and balance  Pt demo good " understanding of the education provided. [place] demonstrated good return demonstration of activities.     Education provided:  [place] verbalized good understanding of education provided.   No spiritual or educational barriers to learning identified.    Assessment   Patient showing improved ecc quad control with jumping and weight acceptance. Patient demonstrated slight increase in knee flexion with jumping. Patient noted a little pain with concentric work of quad during lunges. Patient demonstrated a good understanding of HEP and progression of treatment. Pt  progressing well with ACL with meniscus repair protocol and would continue to benefit from PT intervention to reach functional goals.   This is a 26 y.o. male referred to outpatient physical therapy and presents with a medical diagnosis of s/p R ACL with medial and lateral meniscus repairs and demonstrates limitations as described in the problem list Pt prognosis is Good. Pt will continue to benefit from skilled outpatient physical therapy to address the deficits listed below in the problem list, provide pt/family education and to maximize pt's level of independence in the home and community environment.    Will the patient continue to benefit from skilled PT intervention? yes        Medical necessity is demonstrated by:   - Pain limits function of effected part for all activities  - Unable to participate in daily activities     Progress towards goals: good     Plan   Continue with established Plan of Care towards PT goals.         Trixie Amos, PT, DPT

## 2017-05-03 ENCOUNTER — CLINICAL SUPPORT (OUTPATIENT)
Dept: REHABILITATION | Facility: HOSPITAL | Age: 27
End: 2017-05-03
Attending: ORTHOPAEDIC SURGERY
Payer: COMMERCIAL

## 2017-05-03 DIAGNOSIS — M25.561 ACUTE PAIN OF RIGHT KNEE: ICD-10-CM

## 2017-05-03 PROCEDURE — 97110 THERAPEUTIC EXERCISES: CPT

## 2017-05-03 NOTE — PROGRESS NOTES
"                                                  Physical Therapy Daily Note     Diagnosis:   Encounter Diagnosis   Name Primary?    Acute pain of right knee      Physician: Margarito Machuca MD    Evaluation Date: 12/20/16  Date of Surgery: 12/19/16  Visit #: 28  Total Treatment Time: 60  Precautions: s/p  R ACL with meniscus protocol    Subjective     Pt reports feeling okay today and was not sore after last visit.  Pain: 0/10    Objective     Foto done on 3/6/2017  Status: 62% - limitation:38%   Goal status:CK - at least 40% but less than 60%  Current status:CJ - at least 20% but less than 60%     Knee ROM:  Right:  Flex: 130 degrees  Ext:0 degrees    Left:  Flex: 145 degrees  Ext: 0 degrees    RUE:  4+/5 hip abd  5/5 knee flex and ext  + sit to stand    Pt was instructed in and performed ther ex  to increase strength, endurance, ROM, flexibility and core stabilization with activities as follows:     North Lazar was instructed in and performed therapeutic exercises to develop strength, endurance, ROM, flexibility and core stabilization for 60 minutes including:   Elliptical 10 minutes   RDLs with airplane 3 x 10 reps and medicine ball-np  Mini squats in mirror 2x1 min-np  SLS on bosu 3x 30 sec balance-np  HSS x2 min B  GSS on slant  2 min  Supine hip flexor stretch manual resistance x1 min B-np  High kneel on foam x1 min-np  Lateral walking btb x2 laps  Clamshells gtb 3x fatigue-np  Heel taps yellow box x30-np  bosu rebouder 2x 15 SL-np  Prone hip ext on ball x10 B-np  Supine HS curls 2x15 on ball-np  Ball prone hip flex 2x15-np  Monster walks x2 laps btb  Ballet bridges mtb 3x30 sec-np  Lateral jumps x 1.5 min  Lunges x2 lap with medicine ball and oblique twist  OKC hammer x30-np  3 way mini squat x10-np  Step offs 2" box 20 x 5 sec hold-np  Shuttle RLE press 80# 3x fatigue  MR knee ext on bolster stretch 2x 1 min-np    Biodex: 28% limitation      Treatment ended with ice   x10 min -np    Written Home " Exercises Provided: clamshells, heel taps and balance  Pt demo good understanding of the education provided. North Lazar demonstrated good return demonstration of activities.     Education provided:  North Lazar verbalized good understanding of education provided.   No spiritual or educational barriers to learning identified.    Assessment   Patient showed decreased quad strength by 28% compared to left and 14% endurance deficit on R verse L. Patient progressing well with treatment and continues to show good control of knee with jumping.  Patient demonstrated a good understanding of HEP and progression of treatment. Pt  progressing well with ACL with meniscus repair protocol and would continue to benefit from PT intervention to reach functional goals.   This is a 26 y.o. male referred to outpatient physical therapy and presents with a medical diagnosis of s/p R ACL with medial and lateral meniscus repairs and demonstrates limitations as described in the problem list Pt prognosis is Good. Pt will continue to benefit from skilled outpatient physical therapy to address the deficits listed below in the problem list, provide pt/family education and to maximize pt's level of independence in the home and community environment.    Will the patient continue to benefit from skilled PT intervention? yes        Medical necessity is demonstrated by:   - Pain limits function of effected part for all activities  - Unable to participate in daily activities     Progress towards goals: good     Plan   Continue with established Plan of Care towards PT goals.     Trixie Amos, PT, DPT

## 2017-05-17 ENCOUNTER — CLINICAL SUPPORT (OUTPATIENT)
Dept: REHABILITATION | Facility: HOSPITAL | Age: 27
End: 2017-05-17
Attending: ORTHOPAEDIC SURGERY
Payer: COMMERCIAL

## 2017-05-17 DIAGNOSIS — M25.561 ACUTE PAIN OF RIGHT KNEE: ICD-10-CM

## 2017-05-17 PROCEDURE — 97110 THERAPEUTIC EXERCISES: CPT

## 2017-05-17 NOTE — PROGRESS NOTES
"                                                  Physical Therapy Daily Note     Diagnosis:   Encounter Diagnosis   Name Primary?    Acute pain of right knee      Physician: Margarito Machuca MD    Evaluation Date: 12/20/16  Date of Surgery: 12/19/16  Visit #: 29  Total Treatment Time: 60  Precautions: s/p  R ACL with meniscus protocol    Subjective     Pt reports not doing many of his HEP while on vacation.  Pain: 0/10    Objective       Knee ROM:  Right:  Flex: 130 degrees  Ext:0 degrees    Left:  Flex: 145 degrees  Ext: 0 degrees    RUE:  4+/5 hip abd  5/5 knee flex and ext  + sit to stand    Pt was instructed in and performed ther ex  to increase strength, endurance, ROM, flexibility and core stabilization with activities as follows:     North Lazar was instructed in and performed therapeutic exercises to develop strength, endurance, ROM, flexibility and core stabilization for 60 minutes including:   Elliptical 10 minutes   RDLs with airplane 3 x 10 reps and medicine ball-np  Mini squats in mirror 2x1 min-np  SLS on bosu 3x 30 sec balance-np  HSS x2 min B  GSS on slant  2 min  Supine hip flexor stretch manual resistance x1 min B-np  High kneel on foam x1 min-np  Lateral walking btb x2 laps  Clamshells gtb 3x fatigue-np  Heel taps yellow box x30-np  bosu rebouder 2x 15 SL-np  Prone hip ext on ball x10 B-np  Supine HS curls 2x15 on ball-np  Ball prone hip flex 2x15-np  Monster walks x2 laps btb  Ballet bridges mtb 3x30 sec-np  Lateral jumps x 1.5 min  Lunges x2 lap with medicine ball and oblique twist-np  Jumping lunges 2x10  Yellow box jumps 2x10  Yellow box SL step off x15  OKC hammer x30-np  3 way mini squat x10-np  Step offs 2" box 20 x 5 sec hold-np  Shuttle RLE press 80# 3x fatigue-np  MR knee ext on bolster stretch 2x 1 min-np    Biodex: 28% limitation (5/3/17); 21% deficit (5/1/17)      Treatment ended with ice   x10 min -np    Written Home Exercises Provided: clamshells, heel taps and balance  Pt " louis good understanding of the education provided. [place] Lazar demonstrated good return demonstration of activities.     Education provided:  North Lazar verbalized good understanding of education provided.   No spiritual or educational barriers to learning identified.    Assessment   Patient showed improved quad strength since last visit. Patient still has too weak of hip abd B to start running.  Patient progressing well with treatment and continues to show good control of knee with jumping.  Patient demonstrated a good understanding of HEP and progression of treatment. Pt  progressing well with ACL with meniscus repair protocol and would continue to benefit from PT intervention to reach functional goals.   This is a 26 y.o. male referred to outpatient physical therapy and presents with a medical diagnosis of s/p R ACL with medial and lateral meniscus repairs and demonstrates limitations as described in the problem list Pt prognosis is Good. Pt will continue to benefit from skilled outpatient physical therapy to address the deficits listed below in the problem list, provide pt/family education and to maximize pt's level of independence in the home and community environment.    Will the patient continue to benefit from skilled PT intervention? yes        Medical necessity is demonstrated by:   - Pain limits function of effected part for all activities  - Unable to participate in daily activities     Progress towards goals: good     Plan   Continue with established Plan of Care towards PT goals.     Trixie Amos, PT, DPT

## 2017-05-24 ENCOUNTER — CLINICAL SUPPORT (OUTPATIENT)
Dept: REHABILITATION | Facility: HOSPITAL | Age: 27
End: 2017-05-24
Attending: ORTHOPAEDIC SURGERY
Payer: COMMERCIAL

## 2017-05-24 DIAGNOSIS — M25.561 ACUTE PAIN OF RIGHT KNEE: ICD-10-CM

## 2017-05-24 PROCEDURE — 97110 THERAPEUTIC EXERCISES: CPT

## 2017-05-24 NOTE — PROGRESS NOTES
"                                                  Physical Therapy Daily Note     Diagnosis:   Encounter Diagnosis   Name Primary?    Acute pain of right knee      Physician: Margarito Machuca MD    Evaluation Date: 12/20/16  Date of Surgery: 12/19/16  Visit #: 30  Total Treatment Time: 60  Precautions: s/p  R ACL with meniscus protocol    Subjective     Pt reports he is feeling pretty good today and has some pain when changing positions quickly.  Pain: 0/10    Objective       Knee ROM:  Right:  Flex: 130 degrees  Ext:0 degrees    Left:  Flex: 145 degrees  Ext: 0 degrees    RUE:  4+/5 hip abd  5/5 knee flex and ext  + sit to stand    Pt was instructed in and performed ther ex  to increase strength, endurance, ROM, flexibility and core stabilization with activities as follows:     North Lazar was instructed in and performed therapeutic exercises to develop strength, endurance, ROM, flexibility and core stabilization for 55 minutes including:   Elliptical 10 minutes   RDLs with airplane 3 x 10 reps and medicine ball-np  Mini squats in mirror 2x1 min-np  SLS on bosu 3x 30 sec balance-np  HSS x2 min B  GSS on slant  2 min  Forward bounding blue x1 min  Lateral bounding blue x1 min  Hop test x3 trials B  Supine hip flexor stretch manual resistance x1 min B-np  High kneel on foam x1 min-np  Lateral walking btb x2 laps-np  Clamshells gtb 3x fatigue-np  Heel taps yellow box x30-np  bosu rebouder 2x 15 SL-np  Prone hip ext on ball x10 B-np  Supine HS curls 2x15 on ball-np  Ball prone hip flex 2x15-np  Monster walks x2 laps btb-np  Ballet bridges mtb 3x30 sec-np  Lateral jumps x 1.5 min-np  Lunges x2 lap with medicine ball and oblique twist-np  Jumping lunges 2x10-np  Box jumps green x10 (forward/back and off front)  Yellow box jumps 2x10-np  Yellow box SL step off x15-np  OKC hammer x30-np  3 way mini squat x10-np  Step offs 2" box 20 x 5 sec hold-np  Shuttle RLE press 80# 3x fatigue-np  MR knee ext on bolster stretch " 2x 1 min-np  Cone drills (cutting, shuffling, high knees) x10 min  STM R fat pad x5 min  Reformer hip abd x30 B 3 springs  Manual hip flex stretch x1 min R    Biodex: 28% limitation (5/3/17); 21% deficit (5/1/17)  Hop Test: 80% 5/24/17    Treatment ended with ice   x10 min -np    Written Home Exercises Provided: clamshells, heel taps and balance  Pt demo good understanding of the education provided. [place] Lazar demonstrated good return demonstration of activities.     Education provided:  [place] Lazar verbalized good understanding of education provided.   No spiritual or educational barriers to learning identified.    Assessment   Patient showed improved quad strength since last visit. Patient still has too weak of hip abd B to start running.  Patient progressing well with treatment and continues to show good control of knee with jumping.  Patient demonstrated a good understanding of HEP and progression of treatment. Pt  progressing well with ACL with meniscus repair protocol and would continue to benefit from PT intervention to reach functional goals.   This is a 26 y.o. male referred to outpatient physical therapy and presents with a medical diagnosis of s/p R ACL with medial and lateral meniscus repairs and demonstrates limitations as described in the problem list Pt prognosis is Good. Pt will continue to benefit from skilled outpatient physical therapy to address the deficits listed below in the problem list, provide pt/family education and to maximize pt's level of independence in the home and community environment.    Will the patient continue to benefit from skilled PT intervention? yes        Medical necessity is demonstrated by:   - Pain limits function of effected part for all activities  - Unable to participate in daily activities     Progress towards goals: good     Plan   Continue with established Plan of Care towards PT goals.     Trixie Amos, PT, DPT

## 2017-05-30 ENCOUNTER — CLINICAL SUPPORT (OUTPATIENT)
Dept: REHABILITATION | Facility: HOSPITAL | Age: 27
End: 2017-05-30
Attending: ORTHOPAEDIC SURGERY
Payer: COMMERCIAL

## 2017-05-30 DIAGNOSIS — M25.561 ACUTE PAIN OF RIGHT KNEE: ICD-10-CM

## 2017-05-30 PROCEDURE — 97110 THERAPEUTIC EXERCISES: CPT

## 2017-05-31 NOTE — PROGRESS NOTES
"                                                  Physical Therapy Daily Note     Diagnosis:   Encounter Diagnosis   Name Primary?    Acute pain of right knee      Physician: Margarito Machuca MD    Evaluation Date: 12/20/16  Date of Surgery: 12/19/16  Visit #: 31  Total Treatment Time: 60  Precautions: s/p  R ACL with meniscus protocol    Subjective     Pt reports having mild increase of pain on patella tendon area with SLE activities.     Objective       Knee ROM:  Right:  Flex: 130 degrees  Ext:0 degrees    Left:  Flex: 145 degrees  Ext: 0 degrees    RUE:  4+/5 hip abd  5/5 knee flex and ext  + sit to stand    Pt was instructed in and performed ther ex  to increase strength, endurance, ROM, flexibility and core stabilization with activities as follows:     [place] Lazar was instructed in and performed therapeutic exercises to develop strength, endurance, ROM, flexibility and core stabilization for 55 minutes including:   Elliptical 10 minutes   RDLs with airplane 3 x 10 reps and medicine ball-np  Mini squats in mirror 2x1 min-np  SLS on bosu 3x 30 sec balance-np  HSS x2 min B  GSS on slant  2 min  Forward bounding blue x1 min  Lateral bounding blue x1 min  Hop test x3 trials B  Supine hip flexor stretch manual resistance x1 min B-np  High kneel on foam x1 min-np  Lateral walking btb x2 laps-np  Clamshells gtb 3x fatigue-np  Heel taps yellow box x30-np  bosu rebouder 2x 15 SL-np  Prone hip ext on ball x10 B-np  Supine HS curls 2x15 on ball-np  Ball prone hip flex 2x15-np  Monster walks x2 laps btb-np  Ballet bridges mtb 3x30 sec-np  Lateral jumps x 1.5 min-np  Lunges x2 lap with medicine ball and oblique twist-np  Jumping lunges 2x10-np  Box jumps green x10 (forward/back and off front)  Yellow box jumps 2x10-np  Yellow box SL step off x15-np  OKC hammer x30-np  3 way mini squat x10-np  Step offs 2" box 20 x 5 sec hold-np  Shuttle RLE press 80# 3x fatigue-np  MR knee ext on bolster stretch 2x 1 min-np  Cone " drills (cutting, shuffling, high knees) x10 min  STM R fat pad x5 min  Reformer hip abd x30 B 3 springs  Manual hip flex stretch x1 min R    Biodex: 28% limitation (5/3/17); 21% deficit (5/1/17)  Hop Test: 80% 5/24/17    Treatment ended with ice   x10 min -np    Written Home Exercises Provided: clamshells, heel taps and balance  Pt demo good understanding of the education provided. [place] Lazar demonstrated good return demonstration of activities.     Education provided:  [place] Lazar verbalized good understanding of education provided.   No spiritual or educational barriers to learning identified.    Assessment   Patient with good tolerance to PT today, still with weakness on hip muscles. Patient progressing well with treatment and continues to show good control of knee with jumping.  Patient demonstrated a good understanding of HEP and progression of treatment. Pt  progressing well with ACL with meniscus repair protocol and would continue to benefit from PT intervention to reach functional goals.   This is a 26 y.o. male referred to outpatient physical therapy and presents with a medical diagnosis of s/p R ACL with medial and lateral meniscus repairs and demonstrates limitations as described in the problem list Pt prognosis is Good. Pt will continue to benefit from skilled outpatient physical therapy to address the deficits listed below in the problem list, provide pt/family education and to maximize pt's level of independence in the home and community environment.    Will the patient continue to benefit from skilled PT intervention? yes        Medical necessity is demonstrated by:   - Pain limits function of effected part for all activities  - Unable to participate in daily activities     Progress towards goals: good     Plan   Continue with established Plan of Care towards PT goals. PT/PTA face-to-face:discussed pt's POC and progress towards established PT goals.  Susanne Narayan, PTA  Trixie Amos, PT, DPT

## 2017-06-22 ENCOUNTER — OFFICE VISIT (OUTPATIENT)
Dept: SPORTS MEDICINE | Facility: CLINIC | Age: 27
End: 2017-06-22
Payer: COMMERCIAL

## 2017-06-22 VITALS
BODY MASS INDEX: 24.38 KG/M2 | DIASTOLIC BLOOD PRESSURE: 88 MMHG | HEIGHT: 74 IN | WEIGHT: 190 LBS | SYSTOLIC BLOOD PRESSURE: 152 MMHG | HEART RATE: 107 BPM

## 2017-06-22 DIAGNOSIS — Z98.890 S/P ACL RECONSTRUCTION: Primary | ICD-10-CM

## 2017-06-22 PROCEDURE — 99999 PR PBB SHADOW E&M-EST. PATIENT-LVL III: CPT | Mod: PBBFAC,,, | Performed by: ORTHOPAEDIC SURGERY

## 2017-06-22 PROCEDURE — 99214 OFFICE O/P EST MOD 30 MIN: CPT | Mod: S$GLB,,, | Performed by: ORTHOPAEDIC SURGERY

## 2018-07-25 ENCOUNTER — OFFICE VISIT (OUTPATIENT)
Dept: DERMATOLOGY | Facility: CLINIC | Age: 28
End: 2018-07-25
Payer: COMMERCIAL

## 2018-07-25 DIAGNOSIS — L30.9 DERMATITIS: ICD-10-CM

## 2018-07-25 DIAGNOSIS — B07.9 VIRAL WARTS, UNSPECIFIED TYPE: Primary | ICD-10-CM

## 2018-07-25 DIAGNOSIS — L84 CALLUS OF FOOT: ICD-10-CM

## 2018-07-25 PROCEDURE — 99202 OFFICE O/P NEW SF 15 MIN: CPT | Mod: 25,S$GLB,, | Performed by: PHYSICIAN ASSISTANT

## 2018-07-25 PROCEDURE — 17110 DESTRUCTION B9 LES UP TO 14: CPT | Mod: S$GLB,,, | Performed by: PHYSICIAN ASSISTANT

## 2018-07-25 PROCEDURE — 99999 PR PBB SHADOW E&M-EST. PATIENT-LVL III: CPT | Mod: PBBFAC,,, | Performed by: PHYSICIAN ASSISTANT

## 2018-07-25 NOTE — PROGRESS NOTES

## 2018-07-25 NOTE — PATIENT INSTRUCTIONS
Gentle skin care. Recommend a trial of OTC zyrtec daily. Avoid or modify exposure to heat. If any worsening of the intermittent rash, you should return for follow-up.     Trial of OTC Cera Ve SA for callus of left foot and gentle exfoliation with pumice stone.    Wart Treatment Instructions  Once wart(s) heals from cryotherapy (freezing therapy), start over-the-counter 17% salicyclic acid cream or liquid at bedtime and cover with bandage.  Use a nail file or emery board to file down the wart several times/week to keep the wart soft.  Use this medication nightly until the wart resolves or until your next appointment.  You should discard the emery board upon completion of treatment to prevent further spread of the wart virus.    Cryosurgery    Your doctor has used a method called cryosurgery to treat your skin condition. Cryosurgery refers to the use of very cold substances to treat a variety of skin conditions such as warts, pre-skin cancers, molluscum contagiosum, sun spots, and several benign growths. The substance we use in cryosurgery is liquid nitrogen and is so cold (-195 degrees Celsius) that is burns when administered.     Following treatment in the office, the skin may immediately burn and become red. You may find the area around the lesion is affected as well. It is sometimes necessary to treat not only the lesion, but a small area of the surrounding normal skin to achieve a good response.     A blister, and even a blood filled blister, may form after treatment.   This is a normal response. If the blister is painful, it is acceptable to sterilize a needle and with rubbing alcohol and gently pop the blister. It is important that you gently wash the area with soap and warm water as the blister fluid may contain wart virus if a wart was treated. Do no remove the roof of the blister.     The area treated can take anywhere from 1-3 weeks to heal. Healing time depends on the kind of skin lesion treated, the  location, and how aggressively the lesion was treated. It is recommended that the areas treated are covered with Vaseline or bacitracin ointment and a band-aid. If a band-aid is not practical, just ointment applied several times per day will do. Keeping these areas moist will speed the healing time.    Treatment with liquid nitrogen can leave a scar. In dark skin, it may be a light or dark scar, in light skin it may be a white or pink scar. These will generally fade with time.    If you have any concerns after your treatment, please feel free to call the office.     What Are Warts?  Warts are common skin growths that can appear anywhere on the body. There are many types of warts. In most cases, they are benign (not cancer) and harmless. But warts can be embarrassing. And some warts are painful. The good news is that they can be treated.  Who Gets Warts?  Warts are most common in children and teens. But they can occur at any age. They are also more common in certain occupations, such as those that involve handling meat, poultry, or fish. A weakened immune system may make a person more prone to warts.  What Causes Warts?  Warts are caused by the human papillomavirus (HPV). There are over 150 types of HPV. This virus can spread between people. But you can be exposed to the virus and not get warts. Warts tend to form where skin is damaged or broken. But they can also appear elsewhere. Left untreated, warts can grow in number. They can also spread to other parts of the body.    Types of Warts  There are many types of warts. Some of the most common ones are described below:  · Common warts have a raised, rough surface. Enlarged blood vessels in the warts look like dots on the warts surface. Common warts form mainly on the hands, but can appear on other parts of the body.  · Plantar warts are warts appearing on the soles of the feet. When you stand or walk, pressure makes plantar warts painful. When they form in clusters,  plantar warts are called mosaic warts.  · Periungual warts form under and around fingernails. People who bite their nails are more at risk.  · Filiform warts are slender, fingerlike growths that can dangle from the skin. They most often appear on the face and neck.  · Flat warts are small, smooth growths. They tend to form in clusters on the face, backs of the hands, or legs.  · Genital warts (condyloma) can appear on or around the genitals. Because these warts can spread and are linked to cervical, anal, and other cancers, it is important to have them treated promptly.  © 1134-7428 [employer]. [address] [place] [address], [place] [address]. All rights reserved. This information is not intended as a substitute for professional medical care. Always follow your healthcare professional's instructions.        [facility] - DERMATOLOGY  [address]  [place] [address]  Dept: [phone]  Dept Fax: [phone]

## 2018-07-25 NOTE — PROGRESS NOTES
Subjective:       Patient ID:  North Cantrell is a 27 y.o. male who presents for   Chief Complaint   Patient presents with    Plantar Warts     several years; left foot     History of Present Illness: The patient presents with chief complaint of plantar wart. He also notes the development of a callus on the forefoot because he has been compensating for the wart by adjusting his gait.  Location: left foot  Duration: several years  Signs/Symptoms: painful with pressure    Prior treatments: debridement and salcylic acid    C/o intermittent episodes of hive-like lesions on the trunk which typically resolve within 24 hours or less. He believes to be triggered by heat as with showers or being outdoors. He has pictures of a recent episode.        Review of Systems   Constitutional: Negative for fever and chills.   Gastrointestinal: Negative for nausea and vomiting.   Skin: Positive for activity-related sunscreen use. Negative for itching, rash, daily sunscreen use and recent sunburn.   Hematologic/Lymphatic: Does not bruise/bleed easily.        Objective:    Physical Exam   Constitutional: He appears well-developed and well-nourished. No distress.   Neurological: He is alert and oriented to person, place, and time. He is not disoriented.   Psychiatric: He has a normal mood and affect.   Skin:   Areas Examined (abnormalities noted in diagram):   Head / Face Inspection Performed  Neck Inspection Performed  Chest / Axilla Inspection Performed  Back Inspection Performed  RUE Inspected  LUE Inspection Performed                  Diagram Legend     Erythematous scaling macule/papule c/w actinic keratosis       Vascular papule c/w angioma      Pigmented verrucoid papule/plaque c/w seborrheic keratosis      Yellow umbilicated papule c/w sebaceous hyperplasia      Irregularly shaped tan macule c/w lentigo     1-2 mm smooth white papules consistent with Milia      Movable subcutaneous cyst with punctum c/w epidermal  inclusion cyst      Subcutaneous movable cyst c/w pilar cyst      Firm pink to brown papule c/w dermatofibroma      Pedunculated fleshy papule(s) c/w skin tag(s)      Evenly pigmented macule c/w junctional nevus     Mildly variegated pigmented, slightly irregular-bordered macule c/w mildly atypical nevus      Flesh colored to evenly pigmented papule c/w intradermal nevus       Pink pearly papule/plaque c/w basal cell carcinoma      Erythematous hyperkeratotic cursted plaque c/w SCC      Surgical scar with no sign of skin cancer recurrence      Open and closed comedones      Inflammatory papules and pustules      Verrucoid papule consistent consistent with wart     Erythematous eczematous patches and plaques     Dystrophic onycholytic nail with subungual debris c/w onychomycosis     Umbilicated papule    Erythematous-base heme-crusted tan verrucoid plaque consistent with inflamed seborrheic keratosis     Erythematous Silvery Scaling Plaque c/w Psoriasis     See annotation      Assessment / Plan:        Viral warts, unspecified type  Cryosurgery procedure note:    After risks, benefits, and alternatives discussed, including blistering, pain, scar, recurrence, allergy to anesthesia (if given), hyper- and hypopigmentation, verbal consent from the patient is obtained.  Liquid nitrogen cryosurgery is applied to 1 verruca with prior paring, as detailed in the physical exam, to produce a freeze injury. 2 consecutive freeze thaw cycles are applied to each verruca. The patient is aware that blisters (possibly blood blisters) may form.    Dermatitis  Ddx: urticaria   Reassurance provided. Agree that may urticaria secondary to heat. Trial of Zyrtec qd. Modify exposure to heat. Recommend lukewarm showers. If condition worsens, recommend additional follow-up.    Callus of foot  Trial of Cera Ve SA qhs after shower. Gentle exfoliation prn.       Follow-up if symptoms worsen or fail to improve.

## 2019-02-21 ENCOUNTER — HOSPITAL ENCOUNTER (OUTPATIENT)
Dept: RADIOLOGY | Facility: HOSPITAL | Age: 29
Discharge: HOME | End: 2019-02-21
Attending: ORTHOPAEDIC SURGERY

## 2019-02-21 ENCOUNTER — OFFICE VISIT (OUTPATIENT)
Dept: SPORTS MEDICINE | Facility: CLINIC | Age: 29
End: 2019-02-21

## 2019-02-21 VITALS
BODY MASS INDEX: 24.38 KG/M2 | WEIGHT: 190 LBS | HEIGHT: 74 IN | DIASTOLIC BLOOD PRESSURE: 89 MMHG | HEART RATE: 69 BPM | SYSTOLIC BLOOD PRESSURE: 136 MMHG

## 2019-02-21 DIAGNOSIS — M25.512 LEFT SHOULDER PAIN, UNSPECIFIED CHRONICITY: Primary | ICD-10-CM

## 2019-02-21 DIAGNOSIS — M25.512 LEFT SHOULDER PAIN, UNSPECIFIED CHRONICITY: ICD-10-CM

## 2019-02-21 PROCEDURE — 99213 OFFICE O/P EST LOW 20 MIN: CPT | Mod: PBBFAC,25,PO | Performed by: ORTHOPAEDIC SURGERY

## 2019-02-21 PROCEDURE — 99999 PR PBB SHADOW E&M-EST. PATIENT-LVL III: ICD-10-PCS | Mod: PBBFAC,,, | Performed by: ORTHOPAEDIC SURGERY

## 2019-02-21 PROCEDURE — 99214 OFFICE O/P EST MOD 30 MIN: CPT | Mod: S$PBB,,, | Performed by: ORTHOPAEDIC SURGERY

## 2019-02-21 PROCEDURE — 99999 PR PBB SHADOW E&M-EST. PATIENT-LVL III: CPT | Mod: PBBFAC,,, | Performed by: ORTHOPAEDIC SURGERY

## 2019-02-21 PROCEDURE — 73030 X-RAY EXAM OF SHOULDER: CPT | Mod: TC,FY,PO,LT

## 2019-02-21 PROCEDURE — 99214 PR OFFICE/OUTPT VISIT, EST, LEVL IV, 30-39 MIN: ICD-10-PCS | Mod: S$PBB,,, | Performed by: ORTHOPAEDIC SURGERY

## 2019-02-21 PROCEDURE — 73030 X-RAY EXAM OF SHOULDER: CPT | Mod: 26,LT,, | Performed by: RADIOLOGY

## 2019-02-21 PROCEDURE — 73030 XR SHOULDER COMPLETE 2 OR MORE VIEWS LEFT: ICD-10-PCS | Mod: 26,LT,, | Performed by: RADIOLOGY

## 2019-02-21 NOTE — PROGRESS NOTES
CC: LEFT shoulder pain     28 y.o. Male with a history of left shoulder pain for the past 5 days after he dove while playing soccer [employer] and landed on his outstretched axilla. Since that time he has had anterior shoulder pain and decreased ROM. He is taking ibuprofen with improvement in pain. His pain and ROM have been improving overall. He denies paresthesias or feeling of subluxation or instability of the shoulder.    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 Machuca 2 years ago, doing well. No complaints    History reviewed. No pertinent past medical history.    Past Surgical History:   Procedure Laterality Date    ARTHROSCOPY-KNEE DEBRIDEMENT Right 12/19/2016    Performed by Margarito [facility]uca [facility] at [facility] OR    CHONDROPLASTY-KNEE Right 12/19/2016    Performed by [facility]ito [facility]uca [facility] at [facility] OR    KNEE SURGERY      RECONSTRUCTION-LIGAMENT ANTERIOR CRUCIATE-ARTHROSCOPIC Right 12/19/2016    Performed by [facility]ito [facility]uca [facility] at [facility] OR    REPAIR-MENISCUS Right 12/19/2016    Performed by [facility]uca [facility] at [facility] OR    WISDOM TOOTH EXTRACTION         History reviewed. No pertinent family history.      Current Outpatient Medications:     aspirin (ECOTRIN) 325 MG EC tablet, Take 1 tablet (325 mg total) by mouth once daily. Starting the day after your surgery, Disp: 21 tablet, Rfl: 0    oxycodone-acetaminophen (PERCOCET)  mg per tablet, Take 1 tablet by mouth every 4 to 6 hours as needed for Pain., Disp: 40 tablet, Rfl: 0    tramadol (ULTRAM) 50 mg tablet, Take 1 tablet (50 mg total) by mouth every 4 to 6 hours as needed for Pain., Disp: 40 tablet, Rfl: 0    Review of patient's allergies indicates:  No Known Allergies       REVIEW OF SYSTEMS:  Constitution: Negative. Negative for chills, fever and night sweats.   HENT: Negative for congestion and headaches.    Eyes: Negative for blurred vision, left vision loss and right  "vision loss.   Cardiovascular: Negative for chest pain and syncope.   Respiratory: Negative for cough and shortness of breath.    Endocrine: Negative for polydipsia, polyphagia and polyuria.   Hematologic/Lymphatic: Negative for bleeding problem. Does not bruise/bleed easily.   Skin: Negative for dry skin, itching and rash.   Musculoskeletal: Negative for falls.  Positive for left shoulder pain and muscle weakness.   Gastrointestinal: Negative for abdominal pain and bowel incontinence.   Genitourinary: Negative for bladder incontinence and nocturia.   Neurological: Negative for disturbances in coordination, loss of balance and seizures.   Psychiatric/Behavioral: Negative for depression. The patient does not have insomnia.    Allergic/Immunologic: Negative for hives and persistent infections.      PHYSICAL EXAMINATION:  Vitals:  /89   Pulse 69   Ht 6' 2" (1.88 m)   Wt 86.2 kg (190 lb)   BMI 24.39 kg/m²    General: The patient is alert and oriented x 3.  Mood is pleasant.  Observation of ears, eyes and nose reveal no gross abnormalities.  No labored breathing observed.  Gait is coordinated. Patient can toe walk and heel walk without difficulty.      LEFT Shoulder / Upper Extremity Exam    OBSERVATION:     Swelling  none  Deformity  none   Discoloration  none   Scapular winging none   Scars   none  Atrophy  none    TENDERNESS / CREPITUS (T/C):          T/C      T/C   Clavicle   -/-  SUPRAspinatus    -/-     AC Jt.    -/-  INFRAspinatus  -/-    SC Jt.    -/-  Deltoid    -/-      G. Tuberosity  -/-  LH BICEP groove  -/-   Acromion:  -/-  Midline Neck   -/-     Scapular Spine -/-  Trapezium   -/-   SMA Scapula  -/-  GH jt. line - post  -/-     Scapulothoracic  -/-         ROM: (* = with pain)  Right shoulder   Left shoulder        AROM (PROM)   AROM (PROM)   FE    170° (175°)     170° (175°)     ER at 0°    60°  (65°)    60°  (65°)   ER at 90° ABD  90°  (90°)    90°  (90°)   IR at 90°  ABD   NA  (40°)     NA  " (40°)      IR (spine level)   T10     T10      STRENGTH: (* = with pain) Right shoulder   Left shoulder    SCAPTION   5/5    5/5    IR    5/5    5/5   ER    5/5    5/5   BICEPS   5/5    5/5   Deltoid    5/5    5/5     SIGNS:  Painful side       NEER   -    OKELLS  neg    EDMONDSON   +   SPEEDS  +     DROP ARM   -   BELLY PRESS neg   Superior escape none    LIFT-OFF  neg   X-Body ADD    neg    MOVING VALGUS neg        STABILITY TESTING    Right shoulder   Left shoulder    Translation     Anterior  up face     up face    Posterior  up face    up face    Sulcus   < 10mm    < 10 mm     Signs   Apprehension   neg      neg       Relocation   no change     no change      Jerk test  neg     neg    EXTREMITY NEURO-VASCULAR EXAM:    Sensation grossly intact to light touch all dermatomal regions.    DTR 2+ Biceps, Triceps, BR and Negative Atuls sign   Grossly intact motor function at Elbow, Wrist and Hand   Distal pulses radial and ulnar 2+, brisk cap refill, symmetric.      NECK:  Painless FROM and spinous processes non-tender. Negative Spurlings sign.      OTHER FINDINGS:  + mild scapular dyskinesia, + yergesons    XRAYS:  Xrays including AP, Outlet and Axillary Lateral of Left shoulder are ordered / images reviewed by me:   No fracture dislocation or other pathology   Proximal migration of humeral head: None   GH arthritis: None          ASSESSMENT:   Left shoulder pain, possible:  1. Biceps stretch injury vs anterior capsule stretch injury       PLAN:      1. Discussed further management and treatment options at length. Pain and ROM are overall improving. Recommend continued conservative management with NSAIDs and HEP. Continue NSAIDs  2. If pain persists, patient instructed to call and we will obtain an MRI at that point    All questions were answered, patient will contact us for questions or concerns in the interim.

## 2021-04-28 ENCOUNTER — PATIENT MESSAGE (OUTPATIENT)
Dept: RESEARCH | Facility: HOSPITAL | Age: 31
End: 2021-04-28